# Patient Record
Sex: MALE | Race: WHITE | Employment: FULL TIME | URBAN - METROPOLITAN AREA
[De-identification: names, ages, dates, MRNs, and addresses within clinical notes are randomized per-mention and may not be internally consistent; named-entity substitution may affect disease eponyms.]

---

## 2018-04-09 PROBLEM — I10 ESSENTIAL HYPERTENSION: Status: ACTIVE | Noted: 2018-04-09

## 2019-04-11 PROBLEM — K40.90 RIGHT INGUINAL HERNIA: Status: ACTIVE | Noted: 2019-04-11

## 2019-05-01 RX ORDER — SODIUM CHLORIDE 0.9 % (FLUSH) 0.9 %
5-40 SYRINGE (ML) INJECTION EVERY 8 HOURS
Status: CANCELLED | OUTPATIENT
Start: 2019-05-01

## 2019-05-01 RX ORDER — SODIUM CHLORIDE 0.9 % (FLUSH) 0.9 %
5-40 SYRINGE (ML) INJECTION AS NEEDED
Status: CANCELLED | OUTPATIENT
Start: 2019-05-01

## 2019-05-15 ENCOUNTER — ANESTHESIA (OUTPATIENT)
Dept: SURGERY | Age: 64
End: 2019-05-15
Payer: COMMERCIAL

## 2019-05-15 ENCOUNTER — HOSPITAL ENCOUNTER (OUTPATIENT)
Age: 64
Setting detail: OUTPATIENT SURGERY
Discharge: HOME | End: 2019-05-15
Attending: SURGERY | Admitting: SURGERY
Payer: COMMERCIAL

## 2019-05-15 ENCOUNTER — ANESTHESIA EVENT (OUTPATIENT)
Dept: SURGERY | Age: 64
End: 2019-05-15
Payer: COMMERCIAL

## 2019-05-15 VITALS
TEMPERATURE: 99 F | OXYGEN SATURATION: 92 % | DIASTOLIC BLOOD PRESSURE: 78 MMHG | HEART RATE: 69 BPM | RESPIRATION RATE: 15 BRPM | BODY MASS INDEX: 27.27 KG/M2 | WEIGHT: 194.8 LBS | SYSTOLIC BLOOD PRESSURE: 147 MMHG | HEIGHT: 71 IN

## 2019-05-15 DIAGNOSIS — K40.90 RIGHT INGUINAL HERNIA: Primary | ICD-10-CM

## 2019-05-15 LAB — HGB BLD-MCNC: 14.3 G/DL (ref 13.6–17.2)

## 2019-05-15 PROCEDURE — 74011250636 HC RX REV CODE- 250/636

## 2019-05-15 PROCEDURE — 77030002933 HC SUT MCRYL J&J -A: Performed by: SURGERY

## 2019-05-15 PROCEDURE — 76210000021 HC REC RM PH II 0.5 TO 1 HR: Performed by: SURGERY

## 2019-05-15 PROCEDURE — 77030031139 HC SUT VCRL2 J&J -A: Performed by: SURGERY

## 2019-05-15 PROCEDURE — 74011000250 HC RX REV CODE- 250: Performed by: SURGERY

## 2019-05-15 PROCEDURE — 74011250636 HC RX REV CODE- 250/636: Performed by: SURGERY

## 2019-05-15 PROCEDURE — 77030037088 HC TUBE ENDOTRACH ORAL NSL COVD-A: Performed by: ANESTHESIOLOGY

## 2019-05-15 PROCEDURE — 77030035277 HC OBTRTR BLDELSS DISP INTU -B: Performed by: SURGERY

## 2019-05-15 PROCEDURE — 76010000161 HC OR TIME 1 TO 1.5 HR INTENSV-TIER 1: Performed by: SURGERY

## 2019-05-15 PROCEDURE — 74011000250 HC RX REV CODE- 250

## 2019-05-15 PROCEDURE — 76210000006 HC OR PH I REC 0.5 TO 1 HR: Performed by: SURGERY

## 2019-05-15 PROCEDURE — 85018 HEMOGLOBIN: CPT

## 2019-05-15 PROCEDURE — 74011250636 HC RX REV CODE- 250/636: Performed by: ANESTHESIOLOGY

## 2019-05-15 PROCEDURE — 77030039266 HC ADH SKN EXOFIN S2SG -A: Performed by: SURGERY

## 2019-05-15 PROCEDURE — 77030016151 HC PROTCTR LNS DFOG COVD -B: Performed by: SURGERY

## 2019-05-15 PROCEDURE — 77030035048 HC TRCR ENDOSC OPTCL COVD -B: Performed by: SURGERY

## 2019-05-15 PROCEDURE — 76060000033 HC ANESTHESIA 1 TO 1.5 HR: Performed by: SURGERY

## 2019-05-15 PROCEDURE — 77030032490 HC SLV COMPR SCD KNE COVD -B: Performed by: SURGERY

## 2019-05-15 PROCEDURE — 77030039425 HC BLD LARYNG TRULITE DISP TELE -A: Performed by: ANESTHESIOLOGY

## 2019-05-15 PROCEDURE — 77030022704 HC SUT VLOC COVD -B: Performed by: SURGERY

## 2019-05-15 PROCEDURE — 77030008522 HC TBNG INSUF LAPRO STRY -B: Performed by: SURGERY

## 2019-05-15 PROCEDURE — 77030034849: Performed by: SURGERY

## 2019-05-15 PROCEDURE — 77030019940 HC BLNKT HYPOTHRM STRY -B: Performed by: ANESTHESIOLOGY

## 2019-05-15 PROCEDURE — 77030020703 HC SEAL CANN DISP INTU -B: Performed by: SURGERY

## 2019-05-15 PROCEDURE — 74011250637 HC RX REV CODE- 250/637: Performed by: ANESTHESIOLOGY

## 2019-05-15 PROCEDURE — C1781 MESH (IMPLANTABLE): HCPCS | Performed by: SURGERY

## 2019-05-15 DEVICE — MESH HERN L W4.1XL6.2IN R POLYPR L PORE KNIT LT 3DMAX: Type: IMPLANTABLE DEVICE | Site: INGUINAL | Status: FUNCTIONAL

## 2019-05-15 RX ORDER — ONDANSETRON 2 MG/ML
INJECTION INTRAMUSCULAR; INTRAVENOUS AS NEEDED
Status: DISCONTINUED | OUTPATIENT
Start: 2019-05-15 | End: 2019-05-15 | Stop reason: HOSPADM

## 2019-05-15 RX ORDER — NEOSTIGMINE METHYLSULFATE 1 MG/ML
INJECTION INTRAVENOUS AS NEEDED
Status: DISCONTINUED | OUTPATIENT
Start: 2019-05-15 | End: 2019-05-15 | Stop reason: HOSPADM

## 2019-05-15 RX ORDER — ACETAMINOPHEN 500 MG
1000 TABLET ORAL ONCE
Status: COMPLETED | OUTPATIENT
Start: 2019-05-15 | End: 2019-05-15

## 2019-05-15 RX ORDER — OXYCODONE HYDROCHLORIDE 5 MG/1
5 TABLET ORAL
Status: DISCONTINUED | OUTPATIENT
Start: 2019-05-15 | End: 2019-05-15 | Stop reason: HOSPADM

## 2019-05-15 RX ORDER — FLUMAZENIL 0.1 MG/ML
0.2 INJECTION INTRAVENOUS AS NEEDED
Status: DISCONTINUED | OUTPATIENT
Start: 2019-05-15 | End: 2019-05-15 | Stop reason: HOSPADM

## 2019-05-15 RX ORDER — NALOXONE HYDROCHLORIDE 0.4 MG/ML
0.1 INJECTION, SOLUTION INTRAMUSCULAR; INTRAVENOUS; SUBCUTANEOUS
Status: DISCONTINUED | OUTPATIENT
Start: 2019-05-15 | End: 2019-05-15 | Stop reason: HOSPADM

## 2019-05-15 RX ORDER — MIDAZOLAM HYDROCHLORIDE 1 MG/ML
2 INJECTION, SOLUTION INTRAMUSCULAR; INTRAVENOUS
Status: DISCONTINUED | OUTPATIENT
Start: 2019-05-15 | End: 2019-05-15 | Stop reason: HOSPADM

## 2019-05-15 RX ORDER — HYDROMORPHONE HYDROCHLORIDE 2 MG/ML
0.5 INJECTION, SOLUTION INTRAMUSCULAR; INTRAVENOUS; SUBCUTANEOUS
Status: DISCONTINUED | OUTPATIENT
Start: 2019-05-15 | End: 2019-05-15 | Stop reason: HOSPADM

## 2019-05-15 RX ORDER — SODIUM CHLORIDE 0.9 % (FLUSH) 0.9 %
5-40 SYRINGE (ML) INJECTION AS NEEDED
Status: DISCONTINUED | OUTPATIENT
Start: 2019-05-15 | End: 2019-05-15 | Stop reason: HOSPADM

## 2019-05-15 RX ORDER — OXYCODONE HYDROCHLORIDE 5 MG/1
10 TABLET ORAL
Status: COMPLETED | OUTPATIENT
Start: 2019-05-15 | End: 2019-05-15

## 2019-05-15 RX ORDER — LIDOCAINE HYDROCHLORIDE 20 MG/ML
INJECTION, SOLUTION EPIDURAL; INFILTRATION; INTRACAUDAL; PERINEURAL AS NEEDED
Status: DISCONTINUED | OUTPATIENT
Start: 2019-05-15 | End: 2019-05-15 | Stop reason: HOSPADM

## 2019-05-15 RX ORDER — BUPIVACAINE HYDROCHLORIDE 5 MG/ML
INJECTION, SOLUTION EPIDURAL; INTRACAUDAL AS NEEDED
Status: DISCONTINUED | OUTPATIENT
Start: 2019-05-15 | End: 2019-05-15 | Stop reason: HOSPADM

## 2019-05-15 RX ORDER — SODIUM CHLORIDE, SODIUM LACTATE, POTASSIUM CHLORIDE, CALCIUM CHLORIDE 600; 310; 30; 20 MG/100ML; MG/100ML; MG/100ML; MG/100ML
75 INJECTION, SOLUTION INTRAVENOUS CONTINUOUS
Status: DISCONTINUED | OUTPATIENT
Start: 2019-05-15 | End: 2019-05-15 | Stop reason: HOSPADM

## 2019-05-15 RX ORDER — SODIUM CHLORIDE 0.9 % (FLUSH) 0.9 %
5-40 SYRINGE (ML) INJECTION EVERY 8 HOURS
Status: DISCONTINUED | OUTPATIENT
Start: 2019-05-15 | End: 2019-05-15 | Stop reason: HOSPADM

## 2019-05-15 RX ORDER — FENTANYL CITRATE 50 UG/ML
INJECTION, SOLUTION INTRAMUSCULAR; INTRAVENOUS AS NEEDED
Status: DISCONTINUED | OUTPATIENT
Start: 2019-05-15 | End: 2019-05-15 | Stop reason: HOSPADM

## 2019-05-15 RX ORDER — GLYCOPYRROLATE 0.2 MG/ML
INJECTION INTRAMUSCULAR; INTRAVENOUS AS NEEDED
Status: DISCONTINUED | OUTPATIENT
Start: 2019-05-15 | End: 2019-05-15 | Stop reason: HOSPADM

## 2019-05-15 RX ORDER — HYDROCODONE BITARTRATE AND ACETAMINOPHEN 7.5; 325 MG/1; MG/1
1 TABLET ORAL
Qty: 30 TAB | Refills: 0 | OUTPATIENT
Start: 2019-05-15 | End: 2019-05-22

## 2019-05-15 RX ORDER — KETOROLAC TROMETHAMINE 30 MG/ML
INJECTION, SOLUTION INTRAMUSCULAR; INTRAVENOUS AS NEEDED
Status: DISCONTINUED | OUTPATIENT
Start: 2019-05-15 | End: 2019-05-15 | Stop reason: HOSPADM

## 2019-05-15 RX ORDER — DEXAMETHASONE SODIUM PHOSPHATE 4 MG/ML
INJECTION, SOLUTION INTRA-ARTICULAR; INTRALESIONAL; INTRAMUSCULAR; INTRAVENOUS; SOFT TISSUE AS NEEDED
Status: DISCONTINUED | OUTPATIENT
Start: 2019-05-15 | End: 2019-05-15 | Stop reason: HOSPADM

## 2019-05-15 RX ORDER — ROCURONIUM BROMIDE 10 MG/ML
INJECTION, SOLUTION INTRAVENOUS AS NEEDED
Status: DISCONTINUED | OUTPATIENT
Start: 2019-05-15 | End: 2019-05-15 | Stop reason: HOSPADM

## 2019-05-15 RX ORDER — LIDOCAINE HYDROCHLORIDE 10 MG/ML
0.1 INJECTION INFILTRATION; PERINEURAL AS NEEDED
Status: DISCONTINUED | OUTPATIENT
Start: 2019-05-15 | End: 2019-05-15 | Stop reason: HOSPADM

## 2019-05-15 RX ORDER — PROPOFOL 10 MG/ML
INJECTION, EMULSION INTRAVENOUS AS NEEDED
Status: DISCONTINUED | OUTPATIENT
Start: 2019-05-15 | End: 2019-05-15 | Stop reason: HOSPADM

## 2019-05-15 RX ORDER — CEFAZOLIN SODIUM/WATER 2 G/20 ML
2 SYRINGE (ML) INTRAVENOUS ONCE
Status: COMPLETED | OUTPATIENT
Start: 2019-05-15 | End: 2019-05-15

## 2019-05-15 RX ORDER — DIPHENHYDRAMINE HYDROCHLORIDE 50 MG/ML
12.5 INJECTION, SOLUTION INTRAMUSCULAR; INTRAVENOUS
Status: DISCONTINUED | OUTPATIENT
Start: 2019-05-15 | End: 2019-05-15 | Stop reason: HOSPADM

## 2019-05-15 RX ADMIN — SODIUM CHLORIDE, SODIUM LACTATE, POTASSIUM CHLORIDE, AND CALCIUM CHLORIDE: 600; 310; 30; 20 INJECTION, SOLUTION INTRAVENOUS at 16:19

## 2019-05-15 RX ADMIN — ROCURONIUM BROMIDE 10 MG: 10 INJECTION, SOLUTION INTRAVENOUS at 15:44

## 2019-05-15 RX ADMIN — GLYCOPYRROLATE 0.6 MG: 0.2 INJECTION INTRAMUSCULAR; INTRAVENOUS at 16:07

## 2019-05-15 RX ADMIN — SODIUM CHLORIDE, SODIUM LACTATE, POTASSIUM CHLORIDE, AND CALCIUM CHLORIDE 75 ML/HR: 600; 310; 30; 20 INJECTION, SOLUTION INTRAVENOUS at 13:36

## 2019-05-15 RX ADMIN — OXYCODONE HYDROCHLORIDE 10 MG: 5 TABLET ORAL at 16:37

## 2019-05-15 RX ADMIN — LIDOCAINE HYDROCHLORIDE 80 MG: 20 INJECTION, SOLUTION EPIDURAL; INFILTRATION; INTRACAUDAL; PERINEURAL at 15:07

## 2019-05-15 RX ADMIN — FENTANYL CITRATE 50 MCG: 50 INJECTION, SOLUTION INTRAMUSCULAR; INTRAVENOUS at 15:57

## 2019-05-15 RX ADMIN — ROCURONIUM BROMIDE 40 MG: 10 INJECTION, SOLUTION INTRAVENOUS at 15:07

## 2019-05-15 RX ADMIN — FENTANYL CITRATE 50 MCG: 50 INJECTION, SOLUTION INTRAMUSCULAR; INTRAVENOUS at 15:28

## 2019-05-15 RX ADMIN — PROPOFOL 200 MG: 10 INJECTION, EMULSION INTRAVENOUS at 15:07

## 2019-05-15 RX ADMIN — Medication 2 G: at 15:20

## 2019-05-15 RX ADMIN — ONDANSETRON 4 MG: 2 INJECTION INTRAMUSCULAR; INTRAVENOUS at 16:04

## 2019-05-15 RX ADMIN — ACETAMINOPHEN 1000 MG: 500 TABLET, FILM COATED ORAL at 13:36

## 2019-05-15 RX ADMIN — KETOROLAC TROMETHAMINE 15 MG: 30 INJECTION, SOLUTION INTRAMUSCULAR; INTRAVENOUS at 16:06

## 2019-05-15 RX ADMIN — ROCURONIUM BROMIDE 10 MG: 10 INJECTION, SOLUTION INTRAVENOUS at 15:28

## 2019-05-15 RX ADMIN — NEOSTIGMINE METHYLSULFATE 4 MG: 1 INJECTION INTRAVENOUS at 16:07

## 2019-05-15 RX ADMIN — FENTANYL CITRATE 100 MCG: 50 INJECTION, SOLUTION INTRAMUSCULAR; INTRAVENOUS at 15:07

## 2019-05-15 RX ADMIN — DEXAMETHASONE SODIUM PHOSPHATE 4 MG: 4 INJECTION, SOLUTION INTRA-ARTICULAR; INTRALESIONAL; INTRAMUSCULAR; INTRAVENOUS; SOFT TISSUE at 15:13

## 2019-05-15 NOTE — ANESTHESIA PREPROCEDURE EVALUATION
Relevant Problems No relevant active problems Anesthetic History No history of anesthetic complications Review of Systems / Medical History Patient summary reviewed and pertinent labs reviewed Pulmonary Within defined limits Neuro/Psych Within defined limits Cardiovascular Hypertension: well controlled Exercise tolerance: >4 METS 
  
GI/Hepatic/Renal 
Within defined limits Endo/Other Within defined limits Other Findings Physical Exam 
 
Airway Mallampati: II 
TM Distance: > 6 cm Neck ROM: normal range of motion Mouth opening: Normal 
 
 Cardiovascular Rhythm: regular Rate: normal 
 
 
 
 Dental 
No notable dental hx Pulmonary Breath sounds clear to auscultation Abdominal 
 
 
 
 Other Findings Anesthetic Plan ASA: 2 Anesthesia type: general 
 
 
 
 
 
Anesthetic plan and risks discussed with: Patient

## 2019-05-15 NOTE — ANESTHESIA POSTPROCEDURE EVALUATION
Procedure(s): RIGHT HERNIA INGUINAL REPAIR ROBOTIC ASSISTED. general 
 
Anesthesia Post Evaluation Multimodal analgesia: multimodal analgesia used between 6 hours prior to anesthesia start to PACU discharge Patient location during evaluation: PACU Patient participation: complete - patient participated Level of consciousness: awake Pain management: adequate Airway patency: patent Anesthetic complications: no 
Cardiovascular status: acceptable and hemodynamically stable Respiratory status: acceptable Hydration status: acceptable Comments: Acceptable for discharge from PACU. Post anesthesia nausea and vomiting:  none Vitals Value Taken Time /70 5/15/2019  4:42 PM  
Temp 36.9 °C (98.4 °F) 5/15/2019  4:23 PM  
Pulse 66 5/15/2019  4:53 PM  
Resp 13 5/15/2019  4:53 PM  
SpO2 91 % 5/15/2019  4:53 PM  
Vitals shown include unvalidated device data.

## 2019-05-15 NOTE — DISCHARGE INSTRUCTIONS
Discharge Instructions:    1. May shower. No tub baths. 2. Diet: as tolerated. 3. No driving while taking pain medication. 4. No lifting over 10 pounds. Patient Education        Hernia Repair: What to Expect at [address] are likely to have pain for the next few days. You may also feel like you have the flu, and you may have a low fever and feel tired and nauseated. This is common. You should feel better after a few days and will probably feel much better in 7 days. For several weeks you may feel twinges or pulling in the hernia repair when you move. You may have some bruising on the scrotum and along the penis. This is normal. Men will need to wear a jockstrap or briefs, not boxers, for scrotal support for several days after a groin (inguinal) hernia repair. Eat Your [employer] bicycle shorts may provide good support. This care sheet gives you a general idea about how long it will take for you to recover. But each person recovers at a different pace. Follow the steps below to get better as quickly as possible. How can you care for yourself at home? Activity    · Rest when you feel tired. Getting enough sleep will help you recover.     · Try to walk each day. Start by walking a little more than you did the day before. Bit by bit, increase the amount you walk. Walking boosts blood flow and helps prevent pneumonia and constipation.     · Avoid strenuous activities, such as biking, jogging, weight lifting, or aerobic exercise, until your doctor says it is okay.     · Avoid lifting anything that would make you strain. This may include heavy grocery bags and milk containers, a heavy briefcase or backpack, cat litter or dog food bags, a vacuum , or a child.     · You may drive when you are no longer taking pain medicine and can quickly move your foot from the gas pedal to the brake. You must also be able to sit comfortably for a long period of time, even if you do not plan to go far.  You might get caught in traffic.     · Most people are able to return to work within 1 to 2 weeks after surgery.     · You may shower 24 to 48 hours after surgery, if your doctor okays it. Pat the cut (incision) dry. Do not take a bath for the first 2 weeks, or until your doctor tells you it is okay.     · Your doctor will tell you when you can have sex again. Diet    · You can eat your normal diet. If your stomach is upset, try bland, low-fat foods like plain rice, broiled chicken, toast, and yogurt.     · Drink plenty of fluids (unless your doctor tells you not to).     · You may notice that your bowel movements are not regular right after your surgery. This is common. Avoid constipation and straining with bowel movements. You may want to take a fiber supplement every day. If you have not had a bowel movement after a couple of days, ask your doctor about taking a mild laxative. Medicines    · Your doctor will tell you if and when you can restart your medicines. He or she will also give you instructions about taking any new medicines.     · If you take blood thinners, such as warfarin (Coumadin), clopidogrel (Plavix), or aspirin, be sure to talk to your doctor. He or she will tell you if and when to start taking those medicines again. Make sure that you understand exactly what your doctor wants you to do.     · Be safe with medicines. Take pain medicines exactly as directed. ? If the doctor gave you a prescription medicine for pain, take it as prescribed. ? If you are not taking a prescription pain medicine, take an over-the-counter medicine such as acetaminophen (Tylenol), ibuprofen (Advil, Motrin), or naproxen (Aleve). Read and follow all instructions on the label. ? Do not take two or more pain medicines at the same time unless the doctor told you to. Many pain medicines have acetaminophen, which is Tylenol.  Too much acetaminophen (Tylenol) can be harmful.     · If your doctor prescribed antibiotics, take them as directed. Do not stop taking them just because you feel better. You need to take the full course of antibiotics.     · If you think your pain medicine is making you sick to your stomach:  ? Take your medicine after meals (unless your doctor has told you not to). ? Ask your doctor for a different pain medicine. Incision care    · If you have strips of tape on the cut (incision) the doctor made, leave the tape on for a week or until it falls off.     · If you have staples closing the cut, you will need to visit your doctor in 1 to 2 weeks to have them removed.     · Wash the area daily with warm, soapy water and pat it dry. Follow-up care is a key part of your treatment and safety. Be sure to make and go to all appointments, and call your doctor if you are having problems. It's also a good idea to know your test results and keep a list of the medicines you take. When should you call for help? Call 911 anytime you think you may need emergency care. For example, call if:    · You passed out (lost consciousness).     · You are short of breath.    Call your doctor now or seek immediate medical care if:    · You have pain that does not get better after you take pain medicine.     · You are sick to your stomach and cannot keep fluids down.     · You have signs of a blood clot in your leg (called a deep vein thrombosis), such as:  ? Pain in your calf, back of the knee, thigh, or groin. ? Redness and swelling in your leg or groin.     · You cannot pass stools or gas.     · Bright red blood has soaked through the bandage over your incision.     · You have loose stitches, or your incision comes open.     · You have signs of infection, such as:  ? Increased pain, swelling, warmth, or redness. ? Red streaks leading from the incision. ? Pus draining from the incision. ? A fever.    Watch closely for any changes in your health, and be sure to contact your doctor if you have any problems. Where can you learn more?   Go to http://carlos alberto-yudith.info/. Enter L693 in the search box to learn more about \"Hernia Repair: What to Expect at Home. \"  Current as of: March 27, 2018  Content Version: 11.9  © 9995-8886 [employer]. Care instructions adapted under license by [employer] (which disclaims liability or warranty for this information). If you have questions about a medical condition or this instruction, always ask your healthcare professional. [address] any warranty or liability for your use of this information.

## 2019-05-15 NOTE — H&P
[facility] 
  
Surgery H&P 
 
  
Subjective:  
  
Patient is a 61 y.o. male who was referred for evaluation of  right inguinal hernia. Symptoms were first noted 3 weeks ago. Symptoms did not start at work. He noticed the hernia after severe coughing Pain is dull and aching. Lump present when standing Patient has no symptoms of chronic constipation, chronic cough, difficulty urinating. Patient does not have a history of previous hernia surgery. Patient Active Problem List  
  Diagnosis Date Noted  Right inguinal hernia 2019  Essential hypertension 2018  Routine health maintenance 2016  Varicose veins of both lower extremities 2016  Family history of malignant neoplasm of prostate 2015  Kyphosis 2015 Past Medical History:  
Diagnosis Date  Abnormal pulmonary function test 2015  
  2/18/15 Moderate restriction noted on Spirometry Smoked 1 ppd x 10 years and quit in .  Dyslipidemia    
 Elevated serum GGT level    
  13    Essential hypertension, benign 2015  Family history of prostate cancer    
  His father had it in his mid-67s. Ulysses's last PSA in  was 2.2.  Kyphosis 2015  Microscopic hematuria 2015  Prediabetes 2016  Varicose veins of both legs with pain    
  
     
Past Surgical History:  
Procedure Laterality Date  HX COLONOSCOPY   2011  
  
     
Family History Problem Relation Age of Onset  Hypertension Brother    
 Other Mother    
      Back Problems  Hypertension Father    
 Cancer Father 68  
      Prostate Cancer  Hypertension Brother    
  
Social History  
  
     
Tobacco Use  Smoking status: Former Smoker  
    Packs/day: 1.00  
    Years: 10.00  
    Pack years: 10.00  
    Start date: 1973  
    Last attempt to quit: 1983  
    Years since quittin.2  Smokeless tobacco: Never Used Substance Use Topics  Alcohol use: Yes  
    Alcohol/week: 7.0 oz  
    Types: 14 Cans of beer per week Current Outpatient Medications Medication Sig  
 amLODIPine (NORVASC) 10 mg tablet Take 1 Tab by mouth daily.  enalapril (VASOTEC) 20 mg tablet Take 1 Tab by mouth two (2) times a day. Indications: hypertension  
  
No current facility-administered medications for this visit. Allergies Allergen Reactions  Sulfa (Sulfonamide Antibiotics) Anaphylaxis  
  
  
  
Review of Systems: A comprehensive review of systems was negative except for that written in the History of Present Illness. 
  
Objective:  
  
Visit Vitals /80 Ht 5' 11\" (1.803 m) Wt 197 lb (89.4 kg) BMI 27.48 kg/m²  
  
  
  
  
Physical Exam: 
Gen: WD/WN, appears stated age, no distress HEENT: WNL, no jaundice CV: RRR no murmur Lungs: CTA bilaterally Abdomen: soft, NT/ND. No masses Hernia exam: right inguinal hernia. is reducible. Pulses: 2+ Extremities: no deformity and full range of motion  
  
Data Review: CBC:  
     
Lab Results Component Value Date/Time  
  WBC 4.8 10/09/2017 11:01 AM  
  RBC 4.25 10/09/2017 11:01 AM  
  HGB 13.7 10/09/2017 11:01 AM  
  HCT 40.7 10/09/2017 11:01 AM  
  PLATELET 511 60/85/3759 11:01 AM  
  
BMP:  
     
Lab Results Component Value Date/Time  
  Glucose 111 (H) 10/01/2018 08:35 AM  
  Sodium 142 10/01/2018 08:35 AM  
  Potassium 5.1 10/01/2018 08:35 AM  
  Chloride 103 10/01/2018 08:35 AM  
  CO2 24 10/01/2018 08:35 AM  
  BUN 16 10/01/2018 08:35 AM  
  Creatinine 0.89 10/01/2018 08:35 AM  
  Calcium 9.6 10/01/2018 08:35 AM  
  
  
Assessment:  
  
Right inguinal hernia, easily reducible. 
  
Plan:  
  
Recommend robotic right inguinal hernia repair with mesh.   
  
Discussed possibility of incarceration, strangulation, enlargement in size over time, and the risk of emergency surgery in the face of strangulation. Also discussed the risk of surgery including recurrence which can be up to 50% in the case of incisional or complex hernias, use of prosthetic materials (mesh) and the increased risk of infection, postoperative infection and the possible need for reoperation and removal of mesh if used, possibility of postoperative small bowel obstruction or ileus, and the risks of general anesthetic. The patient understands the risks; any and all questions were answered to the patient's satisfaction. 
  
2. Patient has elected to proceed with surgical treatment. Procedure will be scheduled.   Written consent was obtained. 
  
Signed By: Shad Lane MD

## 2019-05-15 NOTE — OP NOTES
PREPROCEDURE DIAGNOSIS: right inguinal hernia    POSTPROCEDURE DIAGNOSIS: right inguinal hernia    NAME OF PROCEDURE: Robotic assisted  right inguinal hernia repair with mesh,  ARIN       SURGEON: Radha Keenan MD    ASSISTANT: None. ANESTHESIA: General.    BLOOD LOSS: 5 mL    BLOOD REPLACED: None. CONDITION AT COMPLETION: Stable. INDICATIONS: this is a 59-year-old male who presented with a right inguinal hernia. The risks benefits and alternatives to open and minimally invasive repair were all discussed clearly with the patient  Prior to going to the operating room. The patient understood the risks and gave consent for the above procedure. PROCEDURE:  The patient was taken to the operating room where he underwent general anesthetic with no difficulties. The abdomen was prepped and draped in routine sterile fashion. Timeout was performed identifying the patient procedure and laterality of the procedure. IV antibiotics were given at time of induction of anesthetic. A small periumbilical incision was made with a #15 blade and dissection through the subcutaneous tissues to the midline   fascia was performed in a blunt fashion. The fascia was opened in the midline using an Hair technique. He is on trocar was placed into the peritoneal cavity and the abdomen insufflated. The camera was introduced and the abdomen inspected. The right inguinal hernia defect was clearly identified. No other gross pathology was noted within the abdomen. 2 robotic working ports were placed one on the left right at the umbilical level under direct vision. Graspers and electrocautery scissors were inserted under direct vision. The peritoneum was scored at the arcuate line on the right side from midline laterally. The peritoneum was then taken down distally to the inguinal region using gentle electrocautery and sharp scissor dissection.   Blunt graspers were then used to reduce the hernia sac from the inguinal canal and separate the sac from the cord structures which were clearly identified and preserved. The pubic tubercle and Shar's ligament were visualized and palpated. A large lightweight Bard 3-D max mesh was then placed through the trocar along with  A 2-0 Vicryl suture and a absorbable V lock suture. The Vicryl was used to secure the mesh to the pubic tubercle and Shar's ligament and anterior medial abdominal wall. The peritoneum was thenClosed using a running 3 lock suture. The instruments were removed and the ports taken out under direct vision. Trocar was removed and the fascia was closed with figure-of-eight 0 vicryl suture. Skin incisions were then all closed with subcuticular 4-0 monocryl. dermabond dressing was applied. The patient was awakened, extubated, and taken to recovery in good condition. Sheehan catheter was removed at completion of the procedure.

## 2020-10-19 ENCOUNTER — HOSPITAL ENCOUNTER (OUTPATIENT)
Dept: MRI IMAGING | Age: 65
Discharge: HOME | End: 2020-10-19
Attending: UROLOGY
Payer: MEDICARE

## 2020-10-19 DIAGNOSIS — R97.20 ELEVATED PSA: ICD-10-CM

## 2020-10-19 PROCEDURE — 72197 MRI PELVIS W/O & W/DYE: CPT

## 2020-10-19 PROCEDURE — 74011250636 HC RX REV CODE- 250/636: Performed by: UROLOGY

## 2020-10-19 PROCEDURE — 74011000258 HC RX REV CODE- 258: Performed by: UROLOGY

## 2020-10-19 PROCEDURE — A9575 INJ GADOTERATE MEGLUMI 0.1ML: HCPCS | Performed by: UROLOGY

## 2020-10-19 RX ORDER — GADOTERATE MEGLUMINE 376.9 MG/ML
18 INJECTION INTRAVENOUS
Status: COMPLETED | OUTPATIENT
Start: 2020-10-19 | End: 2020-10-19

## 2020-10-19 RX ORDER — SODIUM CHLORIDE 0.9 % (FLUSH) 0.9 %
10 SYRINGE (ML) INJECTION
Status: COMPLETED | OUTPATIENT
Start: 2020-10-19 | End: 2020-10-19

## 2020-10-19 RX ADMIN — Medication 10 ML: at 08:16

## 2020-10-19 RX ADMIN — SODIUM CHLORIDE 100 ML: 900 INJECTION, SOLUTION INTRAVENOUS at 08:16

## 2020-10-19 RX ADMIN — GADOTERATE MEGLUMINE 18 ML: 376.9 INJECTION INTRAVENOUS at 08:16

## 2020-12-14 ENCOUNTER — ANESTHESIA EVENT (OUTPATIENT)
Dept: SURGERY | Age: 65
End: 2020-12-14
Payer: MEDICARE

## 2020-12-15 ENCOUNTER — ANESTHESIA (OUTPATIENT)
Dept: SURGERY | Age: 65
End: 2020-12-15
Payer: MEDICARE

## 2020-12-15 ENCOUNTER — HOSPITAL ENCOUNTER (OUTPATIENT)
Age: 65
Setting detail: OUTPATIENT SURGERY
Discharge: HOME | End: 2020-12-15
Attending: UROLOGY | Admitting: UROLOGY
Payer: MEDICARE

## 2020-12-15 VITALS
HEART RATE: 50 BPM | OXYGEN SATURATION: 98 % | WEIGHT: 200 LBS | BODY MASS INDEX: 27.89 KG/M2 | SYSTOLIC BLOOD PRESSURE: 132 MMHG | RESPIRATION RATE: 16 BRPM | DIASTOLIC BLOOD PRESSURE: 84 MMHG | TEMPERATURE: 97.6 F

## 2020-12-15 LAB — GLUCOSE BLD STRIP.AUTO-MCNC: 106 MG/DL (ref 65–100)

## 2020-12-15 PROCEDURE — 2709999900 HC NON-CHARGEABLE SUPPLY: Performed by: UROLOGY

## 2020-12-15 PROCEDURE — 74011250636 HC RX REV CODE- 250/636: Performed by: UROLOGY

## 2020-12-15 PROCEDURE — 74011250636 HC RX REV CODE- 250/636: Performed by: ANESTHESIOLOGY

## 2020-12-15 PROCEDURE — 76210000020 HC REC RM PH II FIRST 0.5 HR: Performed by: UROLOGY

## 2020-12-15 PROCEDURE — 88305 TISSUE EXAM BY PATHOLOGIST: CPT

## 2020-12-15 PROCEDURE — 74011250636 HC RX REV CODE- 250/636: Performed by: NURSE ANESTHETIST, CERTIFIED REGISTERED

## 2020-12-15 PROCEDURE — 74011000258 HC RX REV CODE- 258: Performed by: UROLOGY

## 2020-12-15 PROCEDURE — 74011000250 HC RX REV CODE- 250: Performed by: NURSE ANESTHETIST, CERTIFIED REGISTERED

## 2020-12-15 PROCEDURE — 77030003481 HC NDL BIOP GUN BARD -B: Performed by: UROLOGY

## 2020-12-15 PROCEDURE — 76210000063 HC OR PH I REC FIRST 0.5 HR: Performed by: UROLOGY

## 2020-12-15 PROCEDURE — 82962 GLUCOSE BLOOD TEST: CPT

## 2020-12-15 PROCEDURE — 76060000032 HC ANESTHESIA 0.5 TO 1 HR: Performed by: UROLOGY

## 2020-12-15 PROCEDURE — 74011250637 HC RX REV CODE- 250/637: Performed by: ANESTHESIOLOGY

## 2020-12-15 PROCEDURE — 76010000154 HC OR TIME FIRST 0.5 HR: Performed by: UROLOGY

## 2020-12-15 RX ORDER — SODIUM CHLORIDE 0.9 % (FLUSH) 0.9 %
5-40 SYRINGE (ML) INJECTION AS NEEDED
Status: DISCONTINUED | OUTPATIENT
Start: 2020-12-15 | End: 2020-12-15 | Stop reason: HOSPADM

## 2020-12-15 RX ORDER — CIPROFLOXACIN 500 MG/1
500 TABLET ORAL 2 TIMES DAILY
Qty: 6 TAB | Refills: 0 | Status: SHIPPED | OUTPATIENT
Start: 2020-12-15 | End: 2021-01-04 | Stop reason: ALTCHOICE

## 2020-12-15 RX ORDER — SODIUM CHLORIDE, SODIUM LACTATE, POTASSIUM CHLORIDE, CALCIUM CHLORIDE 600; 310; 30; 20 MG/100ML; MG/100ML; MG/100ML; MG/100ML
100 INJECTION, SOLUTION INTRAVENOUS CONTINUOUS
Status: DISCONTINUED | OUTPATIENT
Start: 2020-12-15 | End: 2020-12-15 | Stop reason: HOSPADM

## 2020-12-15 RX ORDER — SODIUM CHLORIDE 0.9 % (FLUSH) 0.9 %
5-40 SYRINGE (ML) INJECTION EVERY 8 HOURS
Status: DISCONTINUED | OUTPATIENT
Start: 2020-12-15 | End: 2020-12-15 | Stop reason: HOSPADM

## 2020-12-15 RX ORDER — OXYCODONE AND ACETAMINOPHEN 5; 325 MG/1; MG/1
1 TABLET ORAL AS NEEDED
Status: DISCONTINUED | OUTPATIENT
Start: 2020-12-15 | End: 2020-12-15 | Stop reason: HOSPADM

## 2020-12-15 RX ORDER — SODIUM CHLORIDE 9 MG/ML
50 INJECTION, SOLUTION INTRAVENOUS CONTINUOUS
Status: DISCONTINUED | OUTPATIENT
Start: 2020-12-15 | End: 2020-12-15 | Stop reason: HOSPADM

## 2020-12-15 RX ORDER — DIPHENHYDRAMINE HYDROCHLORIDE 50 MG/ML
12.5 INJECTION, SOLUTION INTRAMUSCULAR; INTRAVENOUS
Status: DISCONTINUED | OUTPATIENT
Start: 2020-12-15 | End: 2020-12-15 | Stop reason: HOSPADM

## 2020-12-15 RX ORDER — MIDAZOLAM HYDROCHLORIDE 1 MG/ML
2 INJECTION, SOLUTION INTRAMUSCULAR; INTRAVENOUS ONCE
Status: COMPLETED | OUTPATIENT
Start: 2020-12-15 | End: 2020-12-15

## 2020-12-15 RX ORDER — PROPOFOL 10 MG/ML
INJECTION, EMULSION INTRAVENOUS AS NEEDED
Status: DISCONTINUED | OUTPATIENT
Start: 2020-12-15 | End: 2020-12-15 | Stop reason: HOSPADM

## 2020-12-15 RX ORDER — MIDAZOLAM HYDROCHLORIDE 1 MG/ML
2 INJECTION, SOLUTION INTRAMUSCULAR; INTRAVENOUS
Status: DISCONTINUED | OUTPATIENT
Start: 2020-12-15 | End: 2020-12-15 | Stop reason: HOSPADM

## 2020-12-15 RX ORDER — PROPOFOL 10 MG/ML
INJECTION, EMULSION INTRAVENOUS
Status: DISCONTINUED | OUTPATIENT
Start: 2020-12-15 | End: 2020-12-15 | Stop reason: HOSPADM

## 2020-12-15 RX ORDER — FAMOTIDINE 20 MG/1
20 TABLET, FILM COATED ORAL ONCE
Status: COMPLETED | OUTPATIENT
Start: 2020-12-15 | End: 2020-12-15

## 2020-12-15 RX ORDER — LIDOCAINE HYDROCHLORIDE 10 MG/ML
0.1 INJECTION INFILTRATION; PERINEURAL AS NEEDED
Status: DISCONTINUED | OUTPATIENT
Start: 2020-12-15 | End: 2020-12-15 | Stop reason: HOSPADM

## 2020-12-15 RX ORDER — FENTANYL CITRATE 50 UG/ML
25 INJECTION, SOLUTION INTRAMUSCULAR; INTRAVENOUS ONCE
Status: DISCONTINUED | OUTPATIENT
Start: 2020-12-15 | End: 2020-12-15 | Stop reason: HOSPADM

## 2020-12-15 RX ORDER — SODIUM CHLORIDE, SODIUM LACTATE, POTASSIUM CHLORIDE, CALCIUM CHLORIDE 600; 310; 30; 20 MG/100ML; MG/100ML; MG/100ML; MG/100ML
75 INJECTION, SOLUTION INTRAVENOUS CONTINUOUS
Status: DISCONTINUED | OUTPATIENT
Start: 2020-12-15 | End: 2020-12-15 | Stop reason: HOSPADM

## 2020-12-15 RX ORDER — ONDANSETRON 2 MG/ML
INJECTION INTRAMUSCULAR; INTRAVENOUS AS NEEDED
Status: DISCONTINUED | OUTPATIENT
Start: 2020-12-15 | End: 2020-12-15 | Stop reason: HOSPADM

## 2020-12-15 RX ORDER — LIDOCAINE HYDROCHLORIDE 20 MG/ML
INJECTION, SOLUTION EPIDURAL; INFILTRATION; INTRACAUDAL; PERINEURAL AS NEEDED
Status: DISCONTINUED | OUTPATIENT
Start: 2020-12-15 | End: 2020-12-15 | Stop reason: HOSPADM

## 2020-12-15 RX ORDER — OXYCODONE HYDROCHLORIDE 5 MG/1
5 TABLET ORAL
Status: DISCONTINUED | OUTPATIENT
Start: 2020-12-15 | End: 2020-12-15 | Stop reason: HOSPADM

## 2020-12-15 RX ORDER — HYDROMORPHONE HYDROCHLORIDE 2 MG/ML
0.5 INJECTION, SOLUTION INTRAMUSCULAR; INTRAVENOUS; SUBCUTANEOUS
Status: DISCONTINUED | OUTPATIENT
Start: 2020-12-15 | End: 2020-12-15 | Stop reason: HOSPADM

## 2020-12-15 RX ADMIN — ONDANSETRON 4 MG: 2 INJECTION INTRAMUSCULAR; INTRAVENOUS at 09:15

## 2020-12-15 RX ADMIN — SODIUM CHLORIDE, SODIUM LACTATE, POTASSIUM CHLORIDE, AND CALCIUM CHLORIDE 1000 ML: 600; 310; 30; 20 INJECTION, SOLUTION INTRAVENOUS at 07:45

## 2020-12-15 RX ADMIN — MIDAZOLAM 2 MG: 1 INJECTION INTRAMUSCULAR; INTRAVENOUS at 08:16

## 2020-12-15 RX ADMIN — FAMOTIDINE 20 MG: 20 TABLET, FILM COATED ORAL at 08:14

## 2020-12-15 RX ADMIN — PROPOFOL 80 MG: 10 INJECTION, EMULSION INTRAVENOUS at 09:11

## 2020-12-15 RX ADMIN — CEFTRIAXONE 1 G: 1 INJECTION, POWDER, FOR SOLUTION INTRAMUSCULAR; INTRAVENOUS at 09:00

## 2020-12-15 RX ADMIN — LIDOCAINE HYDROCHLORIDE 80 MG: 20 INJECTION, SOLUTION EPIDURAL; INFILTRATION; INTRACAUDAL; PERINEURAL at 09:11

## 2020-12-15 RX ADMIN — PROPOFOL 140 MCG/KG/MIN: 10 INJECTION, EMULSION INTRAVENOUS at 09:11

## 2020-12-15 RX ADMIN — SODIUM CHLORIDE, SODIUM LACTATE, POTASSIUM CHLORIDE, AND CALCIUM CHLORIDE: 600; 310; 30; 20 INJECTION, SOLUTION INTRAVENOUS at 08:59

## 2020-12-15 NOTE — ANESTHESIA POSTPROCEDURE EVALUATION
Procedure(s):  MRI FUSION GUIDED PROSTATE BIOPSY. total IV anesthesia    Anesthesia Post Evaluation      Multimodal analgesia: multimodal analgesia used between 6 hours prior to anesthesia start to PACU discharge  Patient location during evaluation: bedside  Patient participation: complete - patient participated  Level of consciousness: awake  Pain management: adequate  Airway patency: patent  Anesthetic complications: no  Cardiovascular status: acceptable and stable  Respiratory status: acceptable and room air  Hydration status: acceptable  Post anesthesia nausea and vomiting:  none      INITIAL Post-op Vital signs:   Vitals Value Taken Time   /70 12/15/2020  9:41 AM   Temp 36.4 °C (97.6 °F) 12/15/2020  9:30 AM   Pulse 53 12/15/2020  9:42 AM   Resp 13 12/15/2020  9:42 AM   SpO2 98 % 12/15/2020  9:42 AM   Vitals shown include unvalidated device data.

## 2020-12-15 NOTE — OP NOTES
Operative Note                Patient: Christiana Calderon [id number]    Date of Surgery: 12/15/20    Preoperative Diagnosis: Elevated psa and prostate lesion(s) on MRI imaging    Postoperative Diagnosis:  same    Surgeon(s) and Role:     * Garner Sever, MD - Primary     Anesthesia:  IV sedation     Procedure: Procedure(s):  [facility] MRI fused TRUS prostate biopsy     RISKS DISCUSSION:     Discussed the risk of surgery including infection, hematoma, bleeding, and the risks of general anesthetic. The patient understands the risks, any and all questions were answered to the patient's satisfaction and they freely signed the consent for operation. [facility] MRI FUSED TRANSRECTAL ULTRASOUND GUIDED BIOPSY OF THE PROSTATE    All risks, benefits and alternatives were again reviewed and he is willing to proceed at this time. The patient was placed in the left lateral decubitus position. I then inserted the transrectal ultrasound probe into the rectum. The prostate was visualized. The prostate appeared homogenous in appearance. Ultrasonographic sweep of the prostate was performed to obtain images to link to MRI via [facility]RONAV. There were 1 regions of interest based upon MRI imaging. 4 biopsies of regions of interest were then obtained using the ultrasound images for guidance that had been linked to the previous MRI using [facility]. I then performed 12 needle core biopsies using a standard sextant biopsy format with traditional ultrasound images for guidance. The ultrasound probe was removed. The patient tolerated the procedure well.       PROSTATE VOLUME:  29 gm PSA 5.2 PSAD 0.18    Estimated Blood Loss:  minimal    Specimens: prostate biopsies             Drains: none                 Implants: * No implants in log *           Signed By: Garner Sever, MD

## 2020-12-15 NOTE — H&P
[place]   : 1955       Chief Complaint   Patient presents with    New Patient    Elevated PSA   HPI   Paz Reyes III is a 72 y.o. male Last seen here in  for elevated pSA. Was applying for a job at [employer] and was found to have microscopic hematuria. Saw Dr. Min Later in 2-15, UA showed Tr heme, 0-3 rbc's. Has family history of prostate cancer in his father. PSA is 2.7 in 3-15. Was followed in [place] for elevated PSA. PSA went from 1.6 to 2.6 in one year. Referred back by Alayna Flores for evaluation and treatment of elevated PSA., in , total PSA 5.2 with 13.8% free PSA. PSA was 5.1 in . PSA was 2.4 , 3.3 in , 3.4 in 2018, 5.0 in  (per pt). He denies any dysuria or hematuria. Voids with good stream.        Past Medical History:   Diagnosis Date    Abnormal pulmonary function test 2015    2/18/15 Moderate restriction noted on Spirometry Smoked 1 ppd x 10 years and quit in .  Dyslipidemia     Elevated PSA     Elevated serum GGT level     13     Essential hypertension, benign 2015    Family history of prostate cancer     His father had it in his mid-67s. Ulysses's last PSA in  was 2.2.  Kyphosis 2015    Microscopic hematuria 2015    Prediabetes 2016    Right inguinal hernia     Varicose veins of both legs with pain            Past Surgical History:   Procedure Laterality Date    HX COLONOSCOPY  2011    HX HERNIA REPAIR Right 05/15/2019            Current Outpatient Medications   Medication Sig Dispense Refill    amLODIPine (NORVASC) 10 mg tablet Take 1 Tab by mouth daily. (Patient taking differently: Take 10 mg by mouth nightly.) 90 Tab 1    enalapril (VASOTEC) 20 mg tablet Take 1 Tab by mouth two (2) times a day. Indications: hypertension (Patient taking differently: Take 20 mg by mouth nightly.  Indications: high blood pressure) 180 Tab 1          Allergies   Allergen Reactions    Sulfa (Sulfonamide Antibiotics) Anaphylaxis     Social History           Socioeconomic History    Marital status:      Spouse name: Gregorio Printdev    Number of children: 2    Years of education: Not on file    Highest education level: Not on file   Occupational History    Occupation: Associate   Social Needs    Financial resource strain: Not on file    Food insecurity     Worry: Not on file     Inability: Not on file   [employer] needs     Medical: Not on file     Non-medical: Not on file   Tobacco Use    Smoking status: Former Smoker     Packs/day: 1.00     Years: 10.00     Pack years: 10.00     Start date: 1973     Last attempt to quit: 1983     Years since quittin.7    Smokeless tobacco: Never Used   Substance and Sexual Activity    Alcohol use: Yes     Alcohol/week: 11.7 standard drinks     Types: 14 Cans of beer per week    Drug use: No    Sexual activity: Yes     Partners: Female   Lifestyle    Physical activity     Days per week: Not on file     Minutes per session: Not on file    Stress: Not on file   Relationships    Social connections     Talks on phone: Not on file     Gets together: Not on file     Attends Mosque service: Not on file     Active member of club or organization: Not on file     Attends meetings of clubs or organizations: Not on file     Relationship status: Not on file    Intimate partner violence     Fear of current or ex partner: Not on file     Emotionally abused: Not on file     Physically abused: Not on file     Forced sexual activity: Not on file   Other Topics Concern    Not on file   Social History Narrative    2/18/15 , works FT as an Associate at [employer], recently moved her from [place], [place] where he [address] for years. Father of Rosemary Duncan (92) and Mormon Felts (3/16/84).             Family History   Problem Relation Age of Onset    Hypertension Brother     Other Mother     Back Problems    Hypertension Father     Cancer Father 68    Prostate Cancer    Hypertension Brother    Review of Systems   Constitutional: Negative for fever and headaches. ENT: Negative for high frequency hearing loss. Respiratory: Negative for shortness of breath. Cardiovascular: Positive for hypertension and varicose veins. Negative for chest pain. GI: Negative for abdominal pain. Genitourinary: Positive for nocturia. Negative for urinary burning and hematuria. Musculoskeletal: Positive for back pain. Neurological: Negative for numbness. Psychological: Negative for depression. Endocrine: Negative for fatigue. Hem/Lymphatic: Positive for easy bruising. Visit Vitals   BP (!) 151/79   Pulse 86   Temp 97.2 °F (36.2 °C) (Temporal)   Wt 200 lb (90.7 kg)   BMI 27.89 kg/m²   Physical Exam   General   Mental Status - Patient is alert and oriented X3. General Appearance - Not in acute distress. Build & Nutrition - Well nourished and Well developed. Gait - Normal.   Eye   Pupil - Bilateral - Normal, Equal and Round. Chest and Lung Exam   Auscultation:   Lung Sounds: - Normal, clear to auscultation. Cardiovascular   Cardiovascular examination reveals - normal heart sounds, regular rate and rhythm with no murmurs. Abdomen   Palpation/Percussion: Palpation and Percussion of the abdomen reveal - No Rebound tenderness, No Rigidity (guarding), No hepatosplenomegaly and No Palpable abdominal masses. Auscultation: Auscultation of the abdomen reveals - Bowel sounds normal.   Male Genitourinary   Evaluation of genitourinary system reveals - scrotum non-tender, no masses, normal testes, no palpable masses, normal epididymides, urethral meatus normal, no discharge, normal penis and normal seminal vesicles.    Rectal   Anorectal Exam: Prostate - not enlarged, no nodules   Peripheral Vascular   Lower Extremity: Inspection - Bilateral - Inspection Normal.   Neurologic   Neurologic evaluation reveals - upper and lower extremity deep tendon reflexes intact bilaterally . Cranial Nerves: - Cranial nerves are grossly intact. Neuropsychiatric   The patient's mood and affect are described as - normal.   Musculoskeletal   Global Assessment   Examination of related systems reveals - no generalized swelling or edema of extremities. Lymphatic   General Lymphatics   Description - No Generalized lymphadenopathy. Tenderness - Non Tender. Urinalysis   UA - Dipstick          Results for orders placed or performed in visit on 10/05/20   AMB POC URINALYSIS DIP STICK AUTO W/ MICRO (PGU) Status: None   Result Value Ref Range Status    Glucose (UA POC) Negative Negative mg/dL Final    Bilirubin (UA POC) Small Negative Final    Ketones (UA POC) 15  Negative Final    Specific gravity (UA POC) 1.025 1.001 - 1.035 Final    Blood (UA POC) Trace Negative Final    pH (UA POC) 5.5 4.6 - 8.0 Final    Protein (UA POC) 30  Negative Final    Urobilinogen (POC) 0.2 mg/dL  Final    Nitrites (UA POC) Negative Negative Final    Leukocyte esterase (UA POC) Negative Negative Final   UA - Micro   WBC - 0   RBC - 0   Bacteria - 0   Epith - 0   Physical Exam   Assessment and Plan     ICD-10-CM ICD-9-CM    1. Elevated PSA  R97.20 790.93 AMB POC URINALYSIS DIP STICK AUTO W/ MICRO (PGU)      MRI PELV W WO CONT           Orders Placed This Encounter    MRI PELV W WO CONT     Standing Status:  Future     Standing Expiration Date:  11/5/2021     Order Specific Question:  STAT Creatinine as indicated     Answer: Yes    AMB POC URINALYSIS DIP STICK AUTO W/ MICRO (PGU)   Discussed prostate biopsy, with or without MRI. Will proceed with MRI. May need MRI fusion biopsy. MRI 10-19-20:    FINDINGS:  -PROSTATE SIZE: 4.3 x 3.2 x 3.8 cm  -CENTRAL GLAND: Normal.  -PERIPHERAL GLAND: There is a small area of focal signal abnormality in the left  apex. Low ADC signal and slight hypervascularity. No other focal peripheral  zone abnormality is seen. -CAPSULE: Capsule appears intact.   No perineural tumor.  -SEMINAL VESICLES: Normal.     -LYMPH NODES: No significant adenopathy. -BONES: No bony lesions. -BLADDER/RECTUM: Normal.     IMPRESSION  IMPRESSION:   1. Small lesion in the left apex. PI-Rads 3-4  2. No evidence of adenopathy or metastatic disease in the lower abdomen/pelvis  Here for MRI fusion prostate biopsy.

## 2020-12-15 NOTE — ANESTHESIA PREPROCEDURE EVALUATION
Relevant Problems   No relevant active problems       Anesthetic History   No history of anesthetic complications            Review of Systems / Medical History  Patient summary reviewed and pertinent labs reviewed    Pulmonary  Within defined limits            Pertinent negatives: Smoker: ex.     Neuro/Psych   Within defined limits           Cardiovascular    Hypertension: well controlled          Hyperlipidemia    Exercise tolerance: >4 METS     GI/Hepatic/Renal     GERD: well controlled           Endo/Other    Diabetes (pre--diet controlled)         Other Findings              Physical Exam    Airway  Mallampati: I  TM Distance: 4 - 6 cm  Neck ROM: normal range of motion   Mouth opening: Normal     Cardiovascular  Regular rate and rhythm,  S1 and S2 normal,  no murmur, click, rub, or gallop  Rhythm: regular  Rate: normal         Dental    Dentition: Caps/crowns     Pulmonary  Breath sounds clear to auscultation               Abdominal  GI exam deferred       Other Findings            Anesthetic Plan    ASA: 2  Anesthesia type: total IV anesthesia          Induction: Intravenous  Anesthetic plan and risks discussed with: Patient and Spouse

## 2020-12-15 NOTE — BRIEF OP NOTE
Brief Postoperative Note    Patient: Amanuel Ramos III  YOB: 1955  MRN: [id number]    Date of Procedure: 12/15/2020     Pre-Op Diagnosis: Elevated PSA [R97.20]    Post-Op Diagnosis: Same as preoperative diagnosis.       Procedure(s):  MRI FUSION GUIDED PROSTATE BIOPSY    Surgeon(s):  Holland Abbasi MD    Surgical Assistant: None    Anesthesia: MAC     Estimated Blood Loss (mL): Minimal    Complications: None    Specimens:   ID Type Source Tests Collected by Time Destination   1 : FERNANDO #1 Preservative Prostate  Holland Abbasi MD 12/15/2020 3153 Pathology   2 : RB Preservative Prostate  Holland Abbasi MD 12/15/2020 8719 Pathology   3 : RM Preservative Prostate  Holland Abbasi MD 12/15/2020 9889 Pathology   4 : RA Preservative Prostate  Holland Abbasi MD 12/15/2020 0217 Pathology   5 : RLB Preservative Polly Abbasi MD 12/15/2020 2201 Pathology   6 : RLM Preservative Polly Abbasi MD 12/15/2020 8751 Pathology   7 : RLA Preservative Polly Abbasi MD 12/15/2020 0920 Pathology   8 : LB Preservative Prostate  Holland Abbasi MD 12/15/2020 0920 Pathology   9 : LM Preservative Polly Abbasi MD 12/15/2020 2120 Pathology   10 : LA Preservative Polly Abbasi MD 12/15/2020 2974 Pathology   11 : LLB Preservative Polly Abbasi MD 12/15/2020 0644 Pathology   12 : LLM Preservative Polly Abbasi MD 12/15/2020 1515 Pathology   13 : LLA Preservative Polly Abbasi MD 12/15/2020 8908 Pathology        Implants: * No implants in log *    Drains: * No LDAs found *    Findings: see op note    Electronically Signed by Rhea Gardner MD on 12/15/2020 at 9:29 AM

## 2020-12-15 NOTE — DISCHARGE INSTRUCTIONS
Patient Education        Prostate Biopsy: About This Test  What is it? A prostate biopsy is a type of test. Your doctor takes about 10 to 12 small tissue samples from your prostate. Then another doctor looks at the tissue under a microscope to see if there are cancer cells. Why is this test done? You may need a prostate biopsy if your doctor found something of concern in your lab work or during your exam. A biopsy can help find out if you have prostate cancer. It may also be done for other reasons, such as monitoring the growth of prostate cancer for men on active surveillance. How do you prepare for the test?  Before you have a prostate biopsy, tell your doctor if you are taking any medicines or using any herbal supplements, or if you are allergic to any medicines. And tell your doctor if you have had bleeding problems, or you take aspirin or some other blood thinner. How is the test done? Some men have a prostate imaging test, such as an MRI or a CT scan, before their biopsy. The test results are used during the biopsy to select the areas of the prostate to sample. Before your biopsy, you may be given antibiotics to prevent infection. You may be asked to take off all of your clothes and put on a hospital gown. You may be given a sedative through a vein (IV) in your arm. The sedative will help you relax and stay still. If you have a general anesthetic, you will be in a recovery room for a few hours after the biopsy. Through the rectum  · You may be asked to kneel, lie on your side, or lie on your back. · Your doctor may inject an anesthetic around and into the prostate to numb the area before samples are taken. · An ultrasound probe will be gently inserted into your rectum. · A thin tool with a spring-loaded needle will be inserted next to the ultrasound probe. The ultrasound helps to locate the areas on the prostate where the samples will be taken.  If you had an MRI or CT scan, the test results will also be used to guide the sampling. · The needle enters the prostate and removes a sample. About 10 to 12 samples are usually taken. Through the perineum  · You will lie on an exam table either on your side or on your back with your knees bent. You will get anesthesia. The anesthesia may make you sleep. Or it may just numb the area being worked on.  · Your doctor will make a small cut in your perineum. Then he or she will collect samples from the prostate through the cut with a special tool. · An ultrasound probe inserted into the rectum may be used to help find the locations in the prostate where samples need to be taken. Sometimes other imaging, such as MRI, is used instead of or along with ultrasound. Or the test results from other imaging, such as an MRI or a CT scan, may be used to guide the sampling. How long will the test take? This test will take from 15 to 45 minutes, depending on how it's done. What happens after the test?  You will probably be able to go home right away, and you may feel tired the rest of the day. Your muscles may ache. Don't do heavy work or exercise for 4 hours after the test.  You may have mild pain in your pelvic area and blood in your urine for up to 5 days. You may have some blood in your semen for a week or longer. You may also have a change in color of your semen for up to 1 month after the biopsy. If the prostate samples are taken through the rectal wall (transrectal biopsy), you may have a small amount of bleeding from your rectum for 2 to 3 days after the biopsy. Follow-up care is a key part of your treatment and safety. Be sure to make and go to all appointments, and call your doctor if you are having problems. It's also a good idea to keep a list of the medicines you take. Ask your doctor when you can expect to have your test results. Where can you learn more?   Go to http://www.gray.com/  Enter Y504 in the search box to learn more about \"Prostate Biopsy: About This Test.\"  Current as of: April 29, 2020               Content Version: 12.6  © 3763-2645 [employer]. Care instructions adapted under license by [employer] (which disclaims liability or warranty for this information). If you have questions about a medical condition or this instruction, always ask your healthcare professional. [address] any warranty or liability for your use of this information. DIET  · Clear liquids until no nausea or vomiting; then light diet for the first day. · Advance to regular diet on second day, unless your doctor orders otherwise. · If nausea and vomiting continues, call your doctor. AFTER ANESTHESIA   · For the first 24 hours: DO NOT Drive, Drink alcoholic beverages, or Make important decisions. · Be aware of dizziness following anesthesia and while taking pain medication. APPOINTMENT DATE/ TIME 1/4/21    YOUR DOCTOR'S PHONE NUMBER [phone]      DISCHARGE SUMMARY from Nurse    PATIENT INSTRUCTIONS:    After general anesthesia or intravenous sedation, for 24 hours or while taking prescription Narcotics:  · Limit your activities  · Do not drive and operate hazardous machinery  · Do not make important personal or business decisions  · Do  not drink alcoholic beverages  · If you have not urinated within 8 hours after discharge, please contact your surgeon on call. *  Please give a list of your current medications to your Primary Care Provider. *  Please update this list whenever your medications are discontinued, doses are      changed, or new medications (including over-the-counter products) are added. *  Please carry medication information at all times in case of emergency situations.       These are general instructions for a healthy lifestyle:    No smoking/ No tobacco products/ Avoid exposure to second hand smoke    Surgeon General's Warning:  Quitting smoking now greatly reduces serious risk to your health. Obesity, smoking, and sedentary lifestyle greatly increases your risk for illness    A healthy diet, regular physical exercise & weight monitoring are important for maintaining a healthy lifestyle    You may be retaining fluid if you have a history of heart failure or if you experience any of the following symptoms:  Weight gain of 3 pounds or more overnight or 5 pounds in a week, increased swelling in our hands or feet or shortness of breath while lying flat in bed. Please call your doctor as soon as you notice any of these symptoms; do not wait until your next office visit. Recognize signs and symptoms of STROKE:    F-face looks uneven    A-arms unable to move or move unevenly    S-speech slurred or non-existent    T-time-call 911 as soon as signs and symptoms begin-DO NOT go       Back to bed or wait to see if you get better-TIME IS BRAIN. Advance Care Planning  People with COVID-19 may have no symptoms, mild symptoms, such as fever, cough, and shortness of breath or they may have more severe illness, developing severe and fatal pneumonia. As a result, Advance Care Planning with attention to naming a health care decision maker (someone you trust to make healthcare decisions for you if you could not speak for yourself) and sharing other health care preferences is important BEFORE a possible health crisis. Please contact your Primary Care Provider to discuss Advance Care Planning.      Preventing the Spread of Coronavirus Disease 2019 in Homes and Residential Communities  For the most recent information go to [employer]    Prevention steps for People with confirmed or suspected COVID-19 (including persons under investigation) who do not need to be hospitalized  and   People with confirmed COVID-19 who were hospitalized and determined to be medically stable to go home    Your healthcare provider and public health staff will evaluate whether you can be cared for at home. If it is determined that you do not need to be hospitalized and can be isolated at home, you will be monitored by staff from your local or state health department. You should follow the prevention steps below until a healthcare provider or local or state health department says you can return to your normal activities. Stay home except to get medical care  People who are mildly ill with COVID-19 are able to isolate at home during their illness. You should restrict activities outside your home, except for getting medical care. Do not go to work, school, or public areas. Avoid using public transportation, ride-sharing, or taxis. Separate yourself from other people and animals in your home  People: As much as possible, you should stay in a specific room and away from other people in your home. Also, you should use a separate bathroom, if available. Animals: You should restrict contact with pets and other animals while you are sick with COVID-19, just like you would around other people. Although there have not been reports of pets or other animals becoming sick with COVID-19, it is still recommended that people sick with COVID-19 limit contact with animals until more information is known about the virus. When possible, have another member of your household care for your animals while you are sick. If you are sick with COVID-19, avoid contact with your pet, including petting, snuggling, being kissed or licked, and sharing food. If you must care for your pet or be around animals while you are sick, wash your hands before and after you interact with pets and wear a facemask. Call ahead before visiting your doctor  If you have a medical appointment, call the healthcare provider and tell them that you have or may have COVID-19. This will help the healthcare providers office take steps to keep other people from getting infected or exposed.   Wear a facemask  You should wear a facemask when you are around other people (e.g., sharing a room or vehicle) or pets and before you enter a healthcare providers office. If you are not able to wear a facemask (for example, because it causes trouble breathing), then people who live with you should not stay in the same room with you, or they should wear a facemask if they enter your room. Cover your coughs and sneezes  Cover your mouth and nose with a tissue when you cough or sneeze. Throw used tissues in a lined trash can. Immediately wash your hands with soap and water for at least 20 seconds or, if soap and water are not available, clean your hands with an alcohol-based hand  that contains at least 60% alcohol. Clean your hands often  Wash your hands often with soap and water for at least 20 seconds, especially after blowing your nose, coughing, or sneezing; going to the bathroom; and before eating or preparing food. If soap and water are not readily available, use an alcohol-based hand  with at least 60% alcohol, covering all surfaces of your hands and rubbing them together until they feel dry. Soap and water are the best option if hands are visibly dirty. Avoid touching your eyes, nose, and mouth with unwashed hands. Avoid sharing personal household items  You should not share dishes, drinking glasses, cups, eating utensils, towels, or bedding with other people or pets in your home. After using these items, they should be washed thoroughly with soap and water. Clean all high-touch surfaces everyday  High touch surfaces include counters, tabletops, doorknobs, bathroom fixtures, toilets, phones, keyboards, tablets, and bedside tables. Also, clean any surfaces that may have blood, stool, or body fluids on them. Use a household cleaning spray or wipe, according to the label instructions.  Labels contain instructions for safe and effective use of the cleaning product including precautions you should take when applying the product, such as wearing gloves and making sure you have good ventilation during use of the product. Monitor your symptoms  Seek prompt medical attention if your illness is worsening (e.g., difficulty breathing). Before seeking care, call your healthcare provider and tell them that you have, or are being evaluated for, COVID-19. Put on a facemask before you enter the facility. These steps will help the healthcare providers office to keep other people in the office or waiting room from getting infected or exposed. Ask your healthcare provider to call the local or state health department. Persons who are placed under active monitoring or facilitated self-monitoring should follow instructions provided by their local health department or occupational health professionals, as appropriate. When working with your local health department check their available hours. If you have a medical emergency and need to call 911, notify the dispatch personnel that you have, or are being evaluated for COVID-19. If possible, put on a facemask before emergency medical services arrive. Discontinuing home isolation  Patients with confirmed COVID-19 should remain under home isolation precautions until the risk of secondary transmission to others is thought to be low. The decision to discontinue home isolation precautions should be made on a case-by-case basis, in consultation with healthcare providers and state and local health departments.

## 2021-02-01 ENCOUNTER — HOSPITAL ENCOUNTER (OUTPATIENT)
Dept: RADIATION ONCOLOGY | Age: 66
Discharge: HOME | End: 2021-02-01
Payer: MEDICARE

## 2021-02-01 VITALS
TEMPERATURE: 97 F | SYSTOLIC BLOOD PRESSURE: 151 MMHG | WEIGHT: 198.9 LBS | DIASTOLIC BLOOD PRESSURE: 93 MMHG | HEART RATE: 79 BPM | RESPIRATION RATE: 16 BRPM | OXYGEN SATURATION: 100 % | BODY MASS INDEX: 27.74 KG/M2

## 2021-02-01 PROCEDURE — 99211 OFF/OP EST MAY X REQ PHY/QHP: CPT

## 2021-02-01 NOTE — CONSULTS
Patient: Vamshi Campbell MRN: [id number]  SSN: [id number]    YOB: 1955  Age: 72 y.o. Sex: male      Other Providers:  Dr. Thomas Lat: rising PSA    DIAGNOSIS: T1c, GS 3+4=7, PSA 5.1; unfavorable intermediate risk prostate cancer (due to % cores +)    PREVIOUS TREATMENT:  1) None    HISTORY OF PRESENT ILLNESS:  Kirt Lowry III is a 72 y.o. male who I am seeing at the request of Dr. Yvan Rivas. His oncologic history dates back this summer when he was found to have an elevated PSA of 5.1. He then underwent MRI pelvis which demonstrated a small lesion in the L apex (PIRADS 3-4). He then underwent MRI guided biopsy with pathology revealing GS 3+4=7 in the FERNANDO #1, RB, RM, & RLB; GS 3+3=6 in the LA and LLM. High grade PIN was found in the RLM, LLB, and LLM. Of note, the prostate measured 29 cc's. Symptomatically, he has minimal LUTS. His AUA score is 5. He has some ED. PAST MEDICAL HISTORY:    Past Medical History:   Diagnosis Date    Abnormal pulmonary function test 2/18/2015    2/18/15 Moderate restriction noted on Spirometry Smoked 1 ppd x 10 years and quit in 1983.  Dyslipidemia     Elevated PSA     Elevated serum GGT level     1/11/13      Essential hypertension, benign 2/18/2015    Family history of prostate cancer     His father had it in his mid-67s. Ulysses's last PSA in 6/14 was 2.2.    GERD (gastroesophageal reflux disease)     no meds    Kyphosis 2/18/2015    Microscopic hematuria 2/18/2015    Personal history of prostate cancer     Prediabetes 9/26/2016    Right inguinal hernia     Varicose veins of both legs with pain        The patient denies history of collagen vascular diseases, pacemaker insertion, prior radiation or prior chemotherapy.      PAST SURGICAL HISTORY:   Past Surgical History:   Procedure Laterality Date    BIOPSY PROSTATE      HX COLONOSCOPY  2/2011    HX HERNIA REPAIR Right 05/15/2019       MEDICATIONS:     Current Outpatient Medications:     atorvastatin (LIPITOR) 20 mg tablet, Take  by mouth daily. , Disp: , Rfl:     amLODIPine (NORVASC) 10 mg tablet, Take 1 Tab by mouth daily. (Patient taking differently: Take 10 mg by mouth nightly.), Disp: 90 Tab, Rfl: 1    enalapril (VASOTEC) 20 mg tablet, Take 1 Tab by mouth two (2) times a day. Indications: hypertension (Patient taking differently: Take 20 mg by mouth nightly. Indications: high blood pressure), Disp: 180 Tab, Rfl: 1    ALLERGIES:   Allergies   Allergen Reactions    Sulfa (Sulfonamide Antibiotics) Anaphylaxis       SOCIAL HISTORY:   Social History     Socioeconomic History    Marital status:      Spouse name: Salbador Aburto    Number of children: 2    Years of education: Not on file    Highest education level: Not on file   Occupational History    Occupation: Associate   Social Needs    Financial resource strain: Not on file    Food insecurity     Worry: Not on file     Inability: Not on file   "[employer] needs     Medical: Not on file     Non-medical: Not on file   Tobacco Use    Smoking status: Former Smoker     Packs/day: 1.00     Years: 10.00     Pack years: 10.00     Start date: 1973     Quit date: 1983     Years since quittin.1    Smokeless tobacco: Never Used   Substance and Sexual Activity    Alcohol use:  Yes     Alcohol/week: 21.0 standard drinks     Types: 21 Cans of beer per week    Drug use: No    Sexual activity: Yes     Partners: Female   Lifestyle    Physical activity     Days per week: Not on file     Minutes per session: Not on file    Stress: Not on file   Relationships    Social connections     Talks on phone: Not on file     Gets together: Not on file     Attends Confucianism service: Not on file     Active member of club or organization: Not on file     Attends meetings of clubs or organizations: Not on file     Relationship status: Not on file    Intimate partner violence     Fear of current or ex partner: Not on file     Emotionally abused: Not on file     Physically abused: Not on file     Forced sexual activity: Not on file   Other Topics Concern    Not on file   Social History Narrative    2/18/15 , works FT as an Associate at [employer], recently moved her from [place], [place] where he [address] for years. Father of Izabela Savage (9/2/92) and Dory Snow (3/16/84). FAMILY HISTORY:   Family History   Problem Relation Age of Onset    Hypertension Brother     Other Mother         Back Problems    Hypertension Father     Cancer Father 68        Prostate Cancer    Hypertension Brother        REVIEW OF SYSTEMS: Please see the completed review of systems sheet in the chart that I have reviewed today. PHYSICAL EXAMINATION:   ECOG Performance status 0  VITAL SIGNS:   Visit Vitals  BP (!) 151/93 (BP 1 Location: Left leg, BP Patient Position: Sitting)   Pulse 79   Temp 97 °F (36.1 °C)   Resp 16   Wt 90.2 kg (198 lb 14.4 oz)   SpO2 100%   BMI 27.74 kg/m²      General: well developed/nourished adult Male in no acute distress; appears stated age  [de-identified]: normocephalic, atraumatic; EOMI  Neck: supple with full ROM;    Respiratory: normal inspiratory effort, no audible wheezes  Extremities: no cyanosis, clubbing, or edema  Musculoskeletal: mobility intact x4; normal ROM in all joints  Skin: no skin lesions identified  Neuro: AOx3; sensation intact x 4; CNII-XII grossly intact  Psych: appropriate affect, insight, and judgement  GI: abdomen soft, non-distended    PATHOLOGY:    Pathology report reviewed - see HPI    LABORATORY:   Lab Results   Component Value Date/Time    Sodium 142 10/01/2018 08:35 AM    Potassium 5.1 10/01/2018 08:35 AM    Chloride 103 10/01/2018 08:35 AM    CO2 24 10/01/2018 08:35 AM    Glucose 111 (H) 10/01/2018 08:35 AM    BUN 16 10/01/2018 08:35 AM    Creatinine 0.89 10/01/2018 08:35 AM    GFR est  10/01/2018 08:35 AM    GFR est non-AA 91 10/01/2018 08:35 AM    Calcium 9.6 10/01/2018 08:35 AM    Albumin 4.7 10/09/2017 11:01 AM    Protein, total 7.2 10/09/2017 11:01 AM    A-G Ratio 1.9 10/09/2017 11:01 AM    ALT (SGPT) 24 10/09/2017 11:01 AM     Lab Results   Component Value Date/Time    WBC 4.8 10/09/2017 11:01 AM    HGB 14.3 05/15/2019 01:24 PM    HCT 40.7 10/09/2017 11:01 AM    PLATELET 958 76/23/6564 11:01 AM       RADIOLOGY:    No results found. IMPRESSION:  Ivone Jean Baptiste is a 72 y.o. male with unfavorable intermediate risk prostate cancer (due to volume of disease on biopsy). PLAN:    Given that he has unfavorable intermediate disease, I will order Decipher test to see if he needs ADT. If so, then I would treat him to 70 Gy in 28 fractions with 6 mos of ADT. If risk profile is low, then I would offer SBRT, 40 Gy in 5 fractions with SpaceOAR placement prior to treatment. I will see him in 3 weeks to discuss results and finalize treatment plan.     Mikey Varner MD   February 1, 2021

## 2021-02-01 NOTE — NURSE NAVIGATOR
Consult for prostate cancer. Aua score 5. MRI pelvis 10-19-20. Pathology 12-15-20.  psa 7/20 5.2. Decipher test ordered per Dr. Von Ortega. Pt will f/u in 3 weeks for results.       Flaco Curry RN

## 2021-02-22 ENCOUNTER — HOSPITAL ENCOUNTER (OUTPATIENT)
Dept: RADIATION ONCOLOGY | Age: 66
Discharge: HOME | End: 2021-02-22
Payer: MEDICARE

## 2021-02-22 VITALS
DIASTOLIC BLOOD PRESSURE: 91 MMHG | BODY MASS INDEX: 26.53 KG/M2 | HEART RATE: 84 BPM | OXYGEN SATURATION: 99 % | WEIGHT: 190.2 LBS | TEMPERATURE: 96.7 F | SYSTOLIC BLOOD PRESSURE: 155 MMHG

## 2021-02-22 PROCEDURE — 99211 OFF/OP EST MAY X REQ PHY/QHP: CPT

## 2021-02-22 NOTE — PROGRESS NOTES
Pt is here today for FUP to discuss the RT treatment plan. The Decipher results came back \"High Risk. \"  If pt chooses RT he will need Lupron for 6 months. Pt stated that he has been talking to his family members who are doctors and who have recommended surgery. At this time pt has not decided upon a treatment option and will call Rad Onc with his decision. Lupron has not been ordered. RT consents were not signed.

## 2021-02-22 NOTE — PROGRESS NOTES
Patient: Ivone Jean Baptiste MRN: [id number]  SSN: [id number]    YOB: 1955  Age: 72 y.o. Sex: male      Other Providers:  Dr. Jadiel Mustafa    DIAGNOSIS: T1c, GS 3+4=7, PSA 5.1; unfavorable intermediate risk prostate cancer (due to % cores +)    PREVIOUS TREATMENT:  1) None    INTERVAL HISTORY:  Mikey Varner III is a 72 y.o. male who is here for f/u after having Decipher testing. I reviewed this and he is high risk and thus would benefit from ADT with RT. No new symptoms reported. UPDATED PAST MEDICAL HISTORY:  Since our prior encounter, Mikey Varner III has not been hospitalized. There have been no significant changes to the medical history. MEDICATIONS:     Current Outpatient Medications:     atorvastatin (LIPITOR) 20 mg tablet, Take  by mouth daily. , Disp: , Rfl:     amLODIPine (NORVASC) 10 mg tablet, Take 1 Tab by mouth daily. (Patient taking differently: Take 10 mg by mouth nightly.), Disp: 90 Tab, Rfl: 1    enalapril (VASOTEC) 20 mg tablet, Take 1 Tab by mouth two (2) times a day. Indications: hypertension (Patient taking differently: Take 20 mg by mouth nightly. Indications: high blood pressure), Disp: 180 Tab, Rfl: 1    ALLERGIES:   Allergies   Allergen Reactions    Sulfa (Sulfonamide Antibiotics) Anaphylaxis       PHYSICAL EXAMINATION:   ECOG Performance status 0  VITAL SIGNS:   Visit Vitals  BP (!) 155/91   Pulse 84   Temp (!) 96.7 °F (35.9 °C)   Wt 86.3 kg (190 lb 3.2 oz)   SpO2 99%   BMI 26.53 kg/m²      General: well developed/nourished adult Male in no acute distress; appears stated age  [de-identified]: normocephalic, atraumatic; EOMI  Neck: supple with full ROM;    Respiratory: normal inspiratory effort, no audible wheezes  Extremities: no cyanosis, clubbing, or edema  Musculoskeletal: mobility intact x4; normal ROM in all joints  Skin: no skin lesions identified  Neuro: AOx3; sensation intact x 4; CNII-XII grossly intact  Psych: appropriate affect, insight, and judgement  GI: abdomen soft, non-distended    LABORATORY:   Lab Results   Component Value Date/Time    Sodium 142 10/01/2018 08:35 AM    Potassium 5.1 10/01/2018 08:35 AM    Chloride 103 10/01/2018 08:35 AM    CO2 24 10/01/2018 08:35 AM    Glucose 111 (H) 10/01/2018 08:35 AM    BUN 16 10/01/2018 08:35 AM    Creatinine 0.89 10/01/2018 08:35 AM    GFR est  10/01/2018 08:35 AM    GFR est non-AA 91 10/01/2018 08:35 AM    Calcium 9.6 10/01/2018 08:35 AM    Albumin 4.7 10/09/2017 11:01 AM    Protein, total 7.2 10/09/2017 11:01 AM    A-G Ratio 1.9 10/09/2017 11:01 AM    ALT (SGPT) 24 10/09/2017 11:01 AM     Lab Results   Component Value Date/Time    WBC 4.8 10/09/2017 11:01 AM    HGB 14.3 05/15/2019 01:24 PM    HCT 40.7 10/09/2017 11:01 AM    PLATELET 246 27/28/9995 11:01 AM       RADIOLOGY:  No results found. IMPRESSION:  Wilkins Bloch III is a 72 y.o. male unfavorable intermediate risk prostate cancer with a high risk Decipher score. PLAN:    I discussed that he would benefit from 6 mos of ADT with RT. He is having second thoughts regarding RT and is contemplating surgery. He will think it over and let us know. Should he get RT, I would treat him to 70 Gy in 28 fractions. Portions of this note were copied from prior encounters and reviewed for accuracy, currency, and represent documentation and tasks completed during this encounter. I verify and attest these portions to be unchanged from prior visits.       Wilkins Bloch, MD  02/22/21

## 2021-03-15 ENCOUNTER — HOSPITAL ENCOUNTER (OUTPATIENT)
Dept: RADIATION ONCOLOGY | Age: 66
Discharge: HOME | End: 2021-03-15
Payer: MEDICARE

## 2021-03-15 VITALS
DIASTOLIC BLOOD PRESSURE: 81 MMHG | SYSTOLIC BLOOD PRESSURE: 136 MMHG | OXYGEN SATURATION: 99 % | HEART RATE: 89 BPM | TEMPERATURE: 96.8 F

## 2021-03-15 DIAGNOSIS — C61 PROSTATE CANCER (HCC): Primary | ICD-10-CM

## 2021-03-22 ENCOUNTER — HOSPITAL ENCOUNTER (OUTPATIENT)
Dept: RADIATION ONCOLOGY | Age: 66
Discharge: HOME | End: 2021-03-22
Payer: MEDICARE

## 2021-03-22 DIAGNOSIS — C61 PROSTATE CANCER (HCC): ICD-10-CM

## 2021-03-22 PROCEDURE — 77470 SPECIAL RADIATION TREATMENT: CPT

## 2021-03-22 PROCEDURE — 84403 ASSAY OF TOTAL TESTOSTERONE: CPT

## 2021-03-22 PROCEDURE — 84153 ASSAY OF PSA TOTAL: CPT

## 2021-03-22 PROCEDURE — 36415 COLL VENOUS BLD VENIPUNCTURE: CPT

## 2021-03-22 RX ORDER — LEUPROLIDE ACETATE 45 MG
45 KIT INTRAMUSCULAR ONCE
Qty: 1 EACH | Refills: 0 | Status: SHIPPED | OUTPATIENT
Start: 2021-03-22 | End: 2021-03-22

## 2021-03-23 LAB
COMMENT, TESC2: NORMAL
PSA SERPL DL<=0.01 NG/ML-MCNC: 4.57 NG/ML (ref 0–4)
TESTOST SERPL-MCNC: 363 NG/DL (ref 264–916)

## 2021-03-30 ENCOUNTER — HOSPITAL ENCOUNTER (OUTPATIENT)
Dept: RADIATION ONCOLOGY | Age: 66
Discharge: HOME | End: 2021-03-30
Payer: MEDICARE

## 2021-03-30 PROCEDURE — 77399 UNLISTED PX MED RADJ PHYSICS: CPT

## 2021-03-30 PROCEDURE — 77301 RADIOTHERAPY DOSE PLAN IMRT: CPT

## 2021-03-30 PROCEDURE — 77300 RADIATION THERAPY DOSE PLAN: CPT

## 2021-04-02 ENCOUNTER — HOSPITAL ENCOUNTER (OUTPATIENT)
Dept: RADIATION ONCOLOGY | Age: 66
Discharge: HOME | End: 2021-04-02

## 2021-04-07 ENCOUNTER — HOSPITAL ENCOUNTER (OUTPATIENT)
Dept: RADIATION ONCOLOGY | Age: 66
Discharge: HOME | End: 2021-04-07
Payer: MEDICARE

## 2021-04-07 PROCEDURE — 77385 HC IMRT TRMT DLVR SMPL: CPT

## 2021-04-08 ENCOUNTER — HOSPITAL ENCOUNTER (OUTPATIENT)
Dept: RADIATION ONCOLOGY | Age: 66
Discharge: HOME | End: 2021-04-08
Payer: MEDICARE

## 2021-04-08 PROCEDURE — 77385 HC IMRT TRMT DLVR SMPL: CPT

## 2021-04-09 ENCOUNTER — HOSPITAL ENCOUNTER (OUTPATIENT)
Dept: RADIATION ONCOLOGY | Age: 66
Discharge: HOME | End: 2021-04-09
Payer: MEDICARE

## 2021-04-09 PROCEDURE — 77385 HC IMRT TRMT DLVR SMPL: CPT

## 2021-04-12 ENCOUNTER — HOSPITAL ENCOUNTER (OUTPATIENT)
Dept: RADIATION ONCOLOGY | Age: 66
Discharge: HOME | End: 2021-04-12
Payer: MEDICARE

## 2021-04-12 PROCEDURE — 77385 HC IMRT TRMT DLVR SMPL: CPT

## 2021-04-13 ENCOUNTER — HOSPITAL ENCOUNTER (OUTPATIENT)
Dept: RADIATION ONCOLOGY | Age: 66
Discharge: HOME | End: 2021-04-13
Payer: MEDICARE

## 2021-04-13 VITALS
BODY MASS INDEX: 27.85 KG/M2 | RESPIRATION RATE: 16 BRPM | HEART RATE: 77 BPM | SYSTOLIC BLOOD PRESSURE: 155 MMHG | WEIGHT: 199.7 LBS | DIASTOLIC BLOOD PRESSURE: 88 MMHG | OXYGEN SATURATION: 99 % | TEMPERATURE: 98.2 F

## 2021-04-13 PROCEDURE — 77336 RADIATION PHYSICS CONSULT: CPT

## 2021-04-13 PROCEDURE — 77385 HC IMRT TRMT DLVR SMPL: CPT

## 2021-04-13 NOTE — PROGRESS NOTES
Patient: Regi España MRN: [id number]  SSN: [id number]    YOB: 1955  Age: 72 y.o. Sex: male      DIAGNOSIS:  T1c, GS 3+4=7, PSA 5.1; unfavorable intermediate risk prostate cancer (due to % cores +)    TREATMENT SITE:  Prostate/SV with ADT    DOSE and FRACTIONATION:  5 of 28 fractions; 1250 cGy of 7000 cGy    INTERVAL HISTORY:  Mook Velasquez III is a 72 y.o. male being treated for prostate cancer. Week 1: no complaints      OBJECTIVE:  NAD  Visit Vitals  BP (!) 155/88 (BP 1 Location: Left upper arm, BP Patient Position: Sitting)   Pulse 77   Temp 98.2 °F (36.8 °C)   Resp 16   Wt 90.6 kg (199 lb 11.2 oz)   SpO2 99%   BMI 27.85 kg/m²       Lab Results   Component Value Date/Time    Sodium 142 10/01/2018 08:35 AM    Potassium 5.1 10/01/2018 08:35 AM    Chloride 103 10/01/2018 08:35 AM    CO2 24 10/01/2018 08:35 AM    Glucose 111 (H) 10/01/2018 08:35 AM    BUN 16 10/01/2018 08:35 AM    Creatinine 0.89 10/01/2018 08:35 AM    GFR est  10/01/2018 08:35 AM    GFR est non-AA 91 10/01/2018 08:35 AM    Calcium 9.6 10/01/2018 08:35 AM    Albumin 4.7 10/09/2017 11:01 AM    Protein, total 7.2 10/09/2017 11:01 AM    A-G Ratio 1.9 10/09/2017 11:01 AM    ALT (SGPT) 24 10/09/2017 11:01 AM     Lab Results   Component Value Date/Time    WBC 4.8 10/09/2017 11:01 AM    HGB 14.3 05/15/2019 01:24 PM    HCT 40.7 10/09/2017 11:01 AM    PLATELET 878 30/08/6520 11:01 AM       ASSESSMENT and PLAN:  Mook Velasquez III is tolerating radiation as anticipated for the current dose and fraction. We will continue on as planned with another treatment visit anticipated next week.         Mook Velasquez MD   April 13, 2021

## 2021-04-14 ENCOUNTER — HOSPITAL ENCOUNTER (OUTPATIENT)
Dept: RADIATION ONCOLOGY | Age: 66
Discharge: HOME | End: 2021-04-14
Payer: MEDICARE

## 2021-04-14 PROCEDURE — 77385 HC IMRT TRMT DLVR SMPL: CPT

## 2021-04-15 ENCOUNTER — HOSPITAL ENCOUNTER (OUTPATIENT)
Dept: RADIATION ONCOLOGY | Age: 66
Discharge: HOME | End: 2021-04-15
Payer: MEDICARE

## 2021-04-15 PROCEDURE — 77385 HC IMRT TRMT DLVR SMPL: CPT

## 2021-04-16 ENCOUNTER — HOSPITAL ENCOUNTER (OUTPATIENT)
Dept: RADIATION ONCOLOGY | Age: 66
Discharge: HOME | End: 2021-04-16
Payer: MEDICARE

## 2021-04-16 PROCEDURE — 77385 HC IMRT TRMT DLVR SMPL: CPT

## 2021-04-19 ENCOUNTER — HOSPITAL ENCOUNTER (OUTPATIENT)
Dept: RADIATION ONCOLOGY | Age: 66
Discharge: HOME | End: 2021-04-19
Payer: MEDICARE

## 2021-04-19 PROCEDURE — 77385 HC IMRT TRMT DLVR SMPL: CPT

## 2021-04-20 ENCOUNTER — HOSPITAL ENCOUNTER (OUTPATIENT)
Dept: RADIATION ONCOLOGY | Age: 66
Discharge: HOME | End: 2021-04-20
Payer: MEDICARE

## 2021-04-20 VITALS
RESPIRATION RATE: 16 BRPM | DIASTOLIC BLOOD PRESSURE: 93 MMHG | SYSTOLIC BLOOD PRESSURE: 150 MMHG | HEART RATE: 76 BPM | OXYGEN SATURATION: 100 % | BODY MASS INDEX: 27.88 KG/M2 | WEIGHT: 199.9 LBS | TEMPERATURE: 98.5 F

## 2021-04-20 PROCEDURE — 77385 HC IMRT TRMT DLVR SMPL: CPT

## 2021-04-20 NOTE — PROGRESS NOTES
Patient: Aime Santos MRN: [id number]  SSN: [id number]    YOB: 1955  Age: 72 y.o. Sex: male      DIAGNOSIS:  T1c, GS 3+4=7, PSA 5.1; unfavorable intermediate risk prostate cancer (due to % cores +)    TREATMENT SITE:  Prostate/SV with ADT    DOSE and FRACTIONATION:  10 of 28 fractions; 2500 cGy of 7000 cGy    INTERVAL HISTORY:  Nestor Mann III is a 72 y.o. male being treated for prostate cancer. Week 1: no complaints    Week 2: no complaints    OBJECTIVE:  NAD  Visit Vitals  BP (!) 150/93 (BP 1 Location: Left upper arm, BP Patient Position: Sitting)   Pulse 76   Temp 98.5 °F (36.9 °C)   Resp 16   Wt 90.7 kg (199 lb 14.4 oz)   SpO2 100%   BMI 27.88 kg/m²       Lab Results   Component Value Date/Time    Sodium 142 10/01/2018 08:35 AM    Potassium 5.1 10/01/2018 08:35 AM    Chloride 103 10/01/2018 08:35 AM    CO2 24 10/01/2018 08:35 AM    Glucose 111 (H) 10/01/2018 08:35 AM    BUN 16 10/01/2018 08:35 AM    Creatinine 0.89 10/01/2018 08:35 AM    GFR est  10/01/2018 08:35 AM    GFR est non-AA 91 10/01/2018 08:35 AM    Calcium 9.6 10/01/2018 08:35 AM    Albumin 4.7 10/09/2017 11:01 AM    Protein, total 7.2 10/09/2017 11:01 AM    A-G Ratio 1.9 10/09/2017 11:01 AM    ALT (SGPT) 24 10/09/2017 11:01 AM     Lab Results   Component Value Date/Time    WBC 4.8 10/09/2017 11:01 AM    HGB 14.3 05/15/2019 01:24 PM    HCT 40.7 10/09/2017 11:01 AM    PLATELET 587 62/91/5018 11:01 AM       ASSESSMENT and PLAN:  Nestor Mann III is tolerating radiation as anticipated for the current dose and fraction. We will continue on as planned with another treatment visit anticipated next week.         Nestor Mann MD   April 20, 2021

## 2021-04-21 ENCOUNTER — HOSPITAL ENCOUNTER (OUTPATIENT)
Dept: RADIATION ONCOLOGY | Age: 66
Discharge: HOME | End: 2021-04-21
Payer: MEDICARE

## 2021-04-21 PROCEDURE — 77336 RADIATION PHYSICS CONSULT: CPT

## 2021-04-21 PROCEDURE — 77385 HC IMRT TRMT DLVR SMPL: CPT

## 2021-04-22 ENCOUNTER — HOSPITAL ENCOUNTER (OUTPATIENT)
Dept: RADIATION ONCOLOGY | Age: 66
Discharge: HOME | End: 2021-04-22
Payer: MEDICARE

## 2021-04-22 PROCEDURE — 77385 HC IMRT TRMT DLVR SMPL: CPT

## 2021-04-23 ENCOUNTER — HOSPITAL ENCOUNTER (OUTPATIENT)
Dept: RADIATION ONCOLOGY | Age: 66
Discharge: HOME | End: 2021-04-23
Payer: MEDICARE

## 2021-04-23 PROCEDURE — 77385 HC IMRT TRMT DLVR SMPL: CPT

## 2021-04-26 ENCOUNTER — HOSPITAL ENCOUNTER (OUTPATIENT)
Dept: RADIATION ONCOLOGY | Age: 66
Discharge: HOME | End: 2021-04-26
Payer: MEDICARE

## 2021-04-26 PROCEDURE — 77385 HC IMRT TRMT DLVR SMPL: CPT

## 2021-04-27 ENCOUNTER — HOSPITAL ENCOUNTER (OUTPATIENT)
Dept: RADIATION ONCOLOGY | Age: 66
Discharge: HOME | End: 2021-04-27
Payer: MEDICARE

## 2021-04-27 VITALS
BODY MASS INDEX: 27.84 KG/M2 | OXYGEN SATURATION: 97 % | DIASTOLIC BLOOD PRESSURE: 91 MMHG | RESPIRATION RATE: 16 BRPM | SYSTOLIC BLOOD PRESSURE: 157 MMHG | HEART RATE: 80 BPM | TEMPERATURE: 98.5 F | WEIGHT: 199.6 LBS

## 2021-04-27 PROCEDURE — 77385 HC IMRT TRMT DLVR SMPL: CPT

## 2021-04-27 NOTE — PROGRESS NOTES
Patient: Harshil Hurst MRN: [id number]  SSN: [id number]    YOB: 1955  Age: 72 y.o. Sex: male      DIAGNOSIS:  T1c, GS 3+4=7, PSA 5.1; unfavorable intermediate risk prostate cancer (due to % cores +)    TREATMENT SITE:  Prostate/SV with ADT    DOSE and FRACTIONATION:  15 of 28 fractions; 3750 cGy of 7000 cGy    INTERVAL HISTORY:  Chey George III is a 72 y.o. male being treated for prostate cancer. Week 1: no complaints    Week 2: no complaints  Week 3: notes loose stool    OBJECTIVE:  NAD  Visit Vitals  BP (!) 157/91 (BP 1 Location: Left upper arm, BP Patient Position: Sitting)   Pulse 80   Temp 98.5 °F (36.9 °C)   Resp 16   Wt 90.5 kg (199 lb 9.6 oz)   SpO2 97%   BMI 27.84 kg/m²       Lab Results   Component Value Date/Time    Sodium 142 10/01/2018 08:35 AM    Potassium 5.1 10/01/2018 08:35 AM    Chloride 103 10/01/2018 08:35 AM    CO2 24 10/01/2018 08:35 AM    Glucose 111 (H) 10/01/2018 08:35 AM    BUN 16 10/01/2018 08:35 AM    Creatinine 0.89 10/01/2018 08:35 AM    GFR est  10/01/2018 08:35 AM    GFR est non-AA 91 10/01/2018 08:35 AM    Calcium 9.6 10/01/2018 08:35 AM    Albumin 4.7 10/09/2017 11:01 AM    Protein, total 7.2 10/09/2017 11:01 AM    A-G Ratio 1.9 10/09/2017 11:01 AM    ALT (SGPT) 24 10/09/2017 11:01 AM     Lab Results   Component Value Date/Time    WBC 4.8 10/09/2017 11:01 AM    HGB 14.3 05/15/2019 01:24 PM    HCT 40.7 10/09/2017 11:01 AM    PLATELET 676 84/82/7238 11:01 AM       ASSESSMENT and PLAN:  Chey George III is tolerating radiation as anticipated for the current dose and fraction. We will continue on as planned with another treatment visit anticipated next week. Imodium recommended prn.        Chey George MD   April 27, 2021

## 2021-04-28 ENCOUNTER — HOSPITAL ENCOUNTER (OUTPATIENT)
Dept: RADIATION ONCOLOGY | Age: 66
Discharge: HOME | End: 2021-04-28
Payer: MEDICARE

## 2021-04-28 PROCEDURE — 77385 HC IMRT TRMT DLVR SMPL: CPT

## 2021-04-28 PROCEDURE — 77336 RADIATION PHYSICS CONSULT: CPT

## 2021-04-29 ENCOUNTER — HOSPITAL ENCOUNTER (OUTPATIENT)
Dept: RADIATION ONCOLOGY | Age: 66
Discharge: HOME | End: 2021-04-29
Payer: MEDICARE

## 2021-04-29 PROCEDURE — 77385 HC IMRT TRMT DLVR SMPL: CPT

## 2021-04-30 ENCOUNTER — HOSPITAL ENCOUNTER (OUTPATIENT)
Dept: RADIATION ONCOLOGY | Age: 66
Discharge: HOME | End: 2021-04-30
Payer: MEDICARE

## 2021-04-30 PROCEDURE — 77385 HC IMRT TRMT DLVR SMPL: CPT

## 2021-05-03 ENCOUNTER — HOSPITAL ENCOUNTER (OUTPATIENT)
Dept: RADIATION ONCOLOGY | Age: 66
Discharge: HOME | End: 2021-05-03
Payer: MEDICARE

## 2021-05-03 PROCEDURE — 77385 HC IMRT TRMT DLVR SMPL: CPT

## 2021-05-04 ENCOUNTER — HOSPITAL ENCOUNTER (OUTPATIENT)
Dept: RADIATION ONCOLOGY | Age: 66
Discharge: HOME | End: 2021-05-04
Payer: MEDICARE

## 2021-05-04 VITALS
BODY MASS INDEX: 27.46 KG/M2 | DIASTOLIC BLOOD PRESSURE: 90 MMHG | SYSTOLIC BLOOD PRESSURE: 142 MMHG | WEIGHT: 196.9 LBS | HEART RATE: 81 BPM | TEMPERATURE: 98.2 F | OXYGEN SATURATION: 99 %

## 2021-05-04 PROCEDURE — 77385 HC IMRT TRMT DLVR SMPL: CPT

## 2021-05-04 NOTE — PROGRESS NOTES
Patient: Regi España MRN: [id number]  SSN: [id number]    YOB: 1955  Age: 72 y.o. Sex: male      DIAGNOSIS:  T1c, GS 3+4=7, PSA 5.1; unfavorable intermediate risk prostate cancer (due to % cores +)    TREATMENT SITE:  Prostate/SV with ADT    DOSE and FRACTIONATION:  20 of 28 fractions; 5000 cGy of 7000 cGy    INTERVAL HISTORY:  Mook Velasquez III is a 72 y.o. male being treated for prostate cancer. Week 1: no complaints    Week 2: no complaints  Week 3: notes loose stool  Week 4: no complaints    OBJECTIVE:  NAD  Visit Vitals  BP (!) 142/90 (BP 1 Location: Right upper arm, BP Patient Position: Sitting)   Pulse 81   Temp 98.2 °F (36.8 °C)   Wt 89.3 kg (196 lb 14.4 oz)   SpO2 99%   BMI 27.46 kg/m²       Lab Results   Component Value Date/Time    Sodium 142 10/01/2018 08:35 AM    Potassium 5.1 10/01/2018 08:35 AM    Chloride 103 10/01/2018 08:35 AM    CO2 24 10/01/2018 08:35 AM    Glucose 111 (H) 10/01/2018 08:35 AM    BUN 16 10/01/2018 08:35 AM    Creatinine 0.89 10/01/2018 08:35 AM    GFR est  10/01/2018 08:35 AM    GFR est non-AA 91 10/01/2018 08:35 AM    Calcium 9.6 10/01/2018 08:35 AM    Albumin 4.7 10/09/2017 11:01 AM    Protein, total 7.2 10/09/2017 11:01 AM    A-G Ratio 1.9 10/09/2017 11:01 AM    ALT (SGPT) 24 10/09/2017 11:01 AM     Lab Results   Component Value Date/Time    WBC 4.8 10/09/2017 11:01 AM    HGB 14.3 05/15/2019 01:24 PM    HCT 40.7 10/09/2017 11:01 AM    PLATELET 415 18/47/8877 11:01 AM       ASSESSMENT and PLAN:  Mook Velasquez III is tolerating radiation as anticipated for the current dose and fraction. We will continue on as planned with another treatment visit anticipated next week. Imodium recommended prn.        Mook Velasquez MD   May 4, 2021

## 2021-05-05 ENCOUNTER — HOSPITAL ENCOUNTER (OUTPATIENT)
Dept: RADIATION ONCOLOGY | Age: 66
Discharge: HOME | End: 2021-05-05
Payer: MEDICARE

## 2021-05-05 PROCEDURE — 77385 HC IMRT TRMT DLVR SMPL: CPT

## 2021-05-05 PROCEDURE — 77336 RADIATION PHYSICS CONSULT: CPT

## 2021-05-06 ENCOUNTER — HOSPITAL ENCOUNTER (OUTPATIENT)
Dept: RADIATION ONCOLOGY | Age: 66
Discharge: HOME | End: 2021-05-06
Payer: MEDICARE

## 2021-05-06 PROCEDURE — 77385 HC IMRT TRMT DLVR SMPL: CPT

## 2021-05-07 ENCOUNTER — HOSPITAL ENCOUNTER (OUTPATIENT)
Dept: RADIATION ONCOLOGY | Age: 66
Discharge: HOME | End: 2021-05-07
Payer: MEDICARE

## 2021-05-07 PROCEDURE — 77385 HC IMRT TRMT DLVR SMPL: CPT

## 2021-05-10 ENCOUNTER — HOSPITAL ENCOUNTER (OUTPATIENT)
Dept: RADIATION ONCOLOGY | Age: 66
Discharge: HOME | End: 2021-05-10
Payer: MEDICARE

## 2021-05-10 DIAGNOSIS — C61 PROSTATE CANCER (HCC): Primary | ICD-10-CM

## 2021-05-10 PROCEDURE — 77385 HC IMRT TRMT DLVR SMPL: CPT

## 2021-05-11 ENCOUNTER — HOSPITAL ENCOUNTER (OUTPATIENT)
Dept: RADIATION ONCOLOGY | Age: 66
Discharge: HOME | End: 2021-05-11
Payer: MEDICARE

## 2021-05-11 VITALS
HEART RATE: 90 BPM | OXYGEN SATURATION: 97 % | DIASTOLIC BLOOD PRESSURE: 97 MMHG | SYSTOLIC BLOOD PRESSURE: 161 MMHG | WEIGHT: 193.6 LBS | BODY MASS INDEX: 27 KG/M2 | TEMPERATURE: 99.1 F

## 2021-05-11 PROCEDURE — 77385 HC IMRT TRMT DLVR SMPL: CPT

## 2021-05-11 RX ORDER — TAMSULOSIN HYDROCHLORIDE 0.4 MG/1
0.4 CAPSULE ORAL DAILY
Qty: 30 CAP | Refills: 5 | Status: SHIPPED | OUTPATIENT
Start: 2021-05-11 | End: 2021-05-17

## 2021-05-11 NOTE — PROGRESS NOTES
Patient: Roderick Marx MRN: [id number]  SSN: [id number]    YOB: 1955  Age: 77 y.o. Sex: male      DIAGNOSIS:  T1c, GS 3+4=7, PSA 5.1; unfavorable intermediate risk prostate cancer (due to % cores +)    TREATMENT SITE:  Prostate/SV with ADT    DOSE and FRACTIONATION:  25 of 28 fractions; 6250 cGy of 7000 cGy    INTERVAL HISTORY:  Neha Hahn III is a 77 y.o. male being treated for prostate cancer. Week 1: no complaints    Week 2: no complaints  Week 3: notes loose stool  Week 4: no complaints  Week 5: increased nocturia    OBJECTIVE:  NAD  Visit Vitals  BP (!) 161/97   Pulse 90   Temp 99.1 °F (37.3 °C)   Wt 87.8 kg (193 lb 9.6 oz)   SpO2 97%   BMI 27.00 kg/m²       Lab Results   Component Value Date/Time    Sodium 142 10/01/2018 08:35 AM    Potassium 5.1 10/01/2018 08:35 AM    Chloride 103 10/01/2018 08:35 AM    CO2 24 10/01/2018 08:35 AM    Glucose 111 (H) 10/01/2018 08:35 AM    BUN 16 10/01/2018 08:35 AM    Creatinine 0.89 10/01/2018 08:35 AM    GFR est  10/01/2018 08:35 AM    GFR est non-AA 91 10/01/2018 08:35 AM    Calcium 9.6 10/01/2018 08:35 AM    Albumin 4.7 10/09/2017 11:01 AM    Protein, total 7.2 10/09/2017 11:01 AM    A-G Ratio 1.9 10/09/2017 11:01 AM    ALT (SGPT) 24 10/09/2017 11:01 AM     Lab Results   Component Value Date/Time    WBC 4.8 10/09/2017 11:01 AM    HGB 14.3 05/15/2019 01:24 PM    HCT 40.7 10/09/2017 11:01 AM    PLATELET 334 76/71/2132 11:01 AM       ASSESSMENT and PLAN:  Neha Hahn III is tolerating radiation as anticipated for the current dose and fraction. We will continue on as planned with another treatment visit anticipated next week. Flomax prescribed.       Neha Hahn MD   May 11, 2021

## 2021-05-12 ENCOUNTER — HOSPITAL ENCOUNTER (OUTPATIENT)
Dept: RADIATION ONCOLOGY | Age: 66
Discharge: HOME | End: 2021-05-12
Payer: MEDICARE

## 2021-05-12 PROCEDURE — 77336 RADIATION PHYSICS CONSULT: CPT

## 2021-05-12 PROCEDURE — 77385 HC IMRT TRMT DLVR SMPL: CPT

## 2021-05-13 ENCOUNTER — HOSPITAL ENCOUNTER (OUTPATIENT)
Dept: RADIATION ONCOLOGY | Age: 66
Discharge: HOME | End: 2021-05-13
Payer: MEDICARE

## 2021-05-13 PROCEDURE — 77385 HC IMRT TRMT DLVR SMPL: CPT

## 2021-05-13 RX ORDER — METHYLPREDNISOLONE 4 MG/1
8 TABLET ORAL
Qty: 1 DOSE PACK | Refills: 0 | Status: SHIPPED | OUTPATIENT
Start: 2021-05-13

## 2021-05-14 ENCOUNTER — HOSPITAL ENCOUNTER (OUTPATIENT)
Dept: RADIATION ONCOLOGY | Age: 66
Discharge: HOME | End: 2021-05-14
Payer: MEDICARE

## 2021-05-14 VITALS
WEIGHT: 196.5 LBS | SYSTOLIC BLOOD PRESSURE: 172 MMHG | HEART RATE: 80 BPM | TEMPERATURE: 98.2 F | DIASTOLIC BLOOD PRESSURE: 98 MMHG | BODY MASS INDEX: 27.41 KG/M2 | OXYGEN SATURATION: 98 % | RESPIRATION RATE: 16 BRPM

## 2021-05-14 PROCEDURE — 77385 HC IMRT TRMT DLVR SMPL: CPT

## 2021-05-14 NOTE — PROGRESS NOTES
Patient: Regi España MRN: [id number]  SSN: [id number]    YOB: 1955  Age: 77 y.o. Sex: male      DIAGNOSIS:  T1c, GS 3+4=7, PSA 5.1; unfavorable intermediate risk prostate cancer (due to % cores +)    TREATMENT SITE:  Prostate/SV with ADT    DOSE and FRACTIONATION:  28 of 28 fractions; 7000 cGy of 7000 cGy    INTERVAL HISTORY:  Mook Velasquez III is a 77 y.o. male being treated for prostate cancer. Week 1: no complaints    Week 2: no complaints  Week 3: notes loose stool  Week 4: no complaints  Week 5: increased nocturia  Week 6: nocturia; didn't  medrol dose pack    OBJECTIVE:  NAD  Visit Vitals  BP (!) 172/98 (BP 1 Location: Left upper arm, BP Patient Position: Sitting)   Pulse 80   Temp 98.2 °F (36.8 °C)   Resp 16   Wt 89.1 kg (196 lb 8 oz)   SpO2 98%   BMI 27.41 kg/m²       Lab Results   Component Value Date/Time    Sodium 142 10/01/2018 08:35 AM    Potassium 5.1 10/01/2018 08:35 AM    Chloride 103 10/01/2018 08:35 AM    CO2 24 10/01/2018 08:35 AM    Glucose 111 (H) 10/01/2018 08:35 AM    BUN 16 10/01/2018 08:35 AM    Creatinine 0.89 10/01/2018 08:35 AM    GFR est  10/01/2018 08:35 AM    GFR est non-AA 91 10/01/2018 08:35 AM    Calcium 9.6 10/01/2018 08:35 AM    Albumin 4.7 10/09/2017 11:01 AM    Protein, total 7.2 10/09/2017 11:01 AM    A-G Ratio 1.9 10/09/2017 11:01 AM    ALT (SGPT) 24 10/09/2017 11:01 AM     Lab Results   Component Value Date/Time    WBC 4.8 10/09/2017 11:01 AM    HGB 14.3 05/15/2019 01:24 PM    HCT 40.7 10/09/2017 11:01 AM    PLATELET 546 05/91/1540 11:01 AM       ASSESSMENT and PLAN:  Mook Velasquez III is tolerating radiation as anticipated for the current dose and fraction. F/U in 6 mos.       Mook Velasquez MD   May 14, 2021

## 2021-05-17 ENCOUNTER — HOSPITAL ENCOUNTER (OUTPATIENT)
Dept: RADIATION ONCOLOGY | Age: 66
Discharge: HOME | End: 2021-05-17
Payer: MEDICARE

## 2021-05-17 PROCEDURE — 77336 RADIATION PHYSICS CONSULT: CPT

## 2021-05-17 RX ORDER — ALFUZOSIN HYDROCHLORIDE 10 MG/1
10 TABLET, EXTENDED RELEASE ORAL DAILY
Qty: 30 TAB | Refills: 5 | Status: SHIPPED | OUTPATIENT
Start: 2021-05-17 | End: 2021-12-14 | Stop reason: SDUPTHER

## 2021-11-29 ENCOUNTER — HOSPITAL ENCOUNTER (OUTPATIENT)
Dept: RADIATION ONCOLOGY | Age: 66
Discharge: HOME | End: 2021-11-29
Payer: MEDICARE

## 2021-11-29 DIAGNOSIS — C61 PROSTATE CANCER (HCC): ICD-10-CM

## 2021-11-29 PROCEDURE — 84153 ASSAY OF PSA TOTAL: CPT

## 2021-11-29 PROCEDURE — 36415 COLL VENOUS BLD VENIPUNCTURE: CPT

## 2021-11-29 PROCEDURE — 84403 ASSAY OF TOTAL TESTOSTERONE: CPT

## 2021-11-30 LAB
PSA SERPL DL<=0.01 NG/ML-MCNC: 0.02 NG/ML (ref 0–4)
TESTOST SERPL-MCNC: 10 NG/DL (ref 264–916)

## 2021-12-14 RX ORDER — ALFUZOSIN HYDROCHLORIDE 10 MG/1
10 TABLET, EXTENDED RELEASE ORAL DAILY
Qty: 30 TABLET | Refills: 5 | Status: SHIPPED | OUTPATIENT
Start: 2021-12-14

## 2022-03-19 PROBLEM — K40.90 RIGHT INGUINAL HERNIA: Status: ACTIVE | Noted: 2019-04-11

## 2022-03-19 PROBLEM — I10 ESSENTIAL HYPERTENSION: Status: ACTIVE | Noted: 2018-04-09

## 2022-04-20 DIAGNOSIS — C61 PROSTATE CANCER (HCC): Primary | ICD-10-CM

## 2022-06-09 ENCOUNTER — APPOINTMENT (OUTPATIENT)
Dept: RADIATION ONCOLOGY | Age: 67
End: 2022-06-09

## 2022-06-10 ENCOUNTER — HOSPITAL ENCOUNTER (OUTPATIENT)
Dept: RADIATION ONCOLOGY | Age: 67
Setting detail: RECURRING SERIES
Discharge: HOME | End: 2022-06-13
Payer: MEDICARE

## 2022-06-10 DIAGNOSIS — C61 PROSTATE CANCER (HCC): ICD-10-CM

## 2022-06-10 PROCEDURE — 84403 ASSAY OF TOTAL TESTOSTERONE: CPT

## 2022-06-10 PROCEDURE — 84153 ASSAY OF PSA TOTAL: CPT

## 2022-06-10 PROCEDURE — 36415 COLL VENOUS BLD VENIPUNCTURE: CPT

## 2022-06-11 LAB
PSA SERPL DL<=0.01 NG/ML-MCNC: 0.08 NG/ML (ref 0–4)
TESTOST SERPL-MCNC: 299 NG/DL (ref 264–916)

## 2022-06-16 ENCOUNTER — APPOINTMENT (OUTPATIENT)
Dept: RADIATION ONCOLOGY | Age: 67
End: 2022-06-16

## 2022-06-17 DIAGNOSIS — C61 PROSTATE CANCER (HCC): Primary | ICD-10-CM

## 2022-12-08 ENCOUNTER — APPOINTMENT (OUTPATIENT)
Dept: RADIATION ONCOLOGY | Age: 67
End: 2022-12-08
Attending: RADIOLOGY
Payer: MEDICARE

## 2022-12-09 ENCOUNTER — HOSPITAL ENCOUNTER (OUTPATIENT)
Dept: RADIATION ONCOLOGY | Age: 67
Setting detail: RECURRING SERIES
Discharge: HOME | End: 2022-12-12
Attending: RADIOLOGY
Payer: MEDICARE

## 2022-12-09 DIAGNOSIS — C61 PROSTATE CANCER (HCC): ICD-10-CM

## 2022-12-09 PROCEDURE — 36415 COLL VENOUS BLD VENIPUNCTURE: CPT

## 2022-12-09 PROCEDURE — 84153 ASSAY OF PSA TOTAL: CPT

## 2022-12-09 PROCEDURE — 84403 ASSAY OF TOTAL TESTOSTERONE: CPT

## 2022-12-10 LAB
PSA SERPL DL<=0.01 NG/ML-MCNC: 0.19 NG/ML (ref 0–4)
TESTOST SERPL-MCNC: 288 NG/DL (ref 264–916)

## 2022-12-14 DIAGNOSIS — C61 PROSTATE CANCER (HCC): Primary | ICD-10-CM

## 2022-12-15 ENCOUNTER — APPOINTMENT (OUTPATIENT)
Dept: RADIATION ONCOLOGY | Age: 67
End: 2022-12-15
Attending: RADIOLOGY
Payer: MEDICARE

## 2023-06-06 ENCOUNTER — HOSPITAL ENCOUNTER (OUTPATIENT)
Dept: RADIATION ONCOLOGY | Age: 68
Setting detail: RECURRING SERIES
Discharge: HOME | End: 2023-06-09
Attending: RADIOLOGY
Payer: MEDICARE

## 2023-06-06 DIAGNOSIS — C61 PROSTATE CANCER (HCC): ICD-10-CM

## 2023-06-06 PROCEDURE — 36415 COLL VENOUS BLD VENIPUNCTURE: CPT

## 2023-06-06 PROCEDURE — 84403 ASSAY OF TOTAL TESTOSTERONE: CPT

## 2023-06-06 PROCEDURE — 84153 ASSAY OF PSA TOTAL: CPT

## 2023-06-08 ENCOUNTER — APPOINTMENT (OUTPATIENT)
Dept: RADIATION ONCOLOGY | Age: 68
End: 2023-06-08
Attending: RADIOLOGY
Payer: MEDICARE

## 2023-06-08 LAB
PSA SERPL DL<=0.01 NG/ML-MCNC: 0.11 NG/ML (ref 0–4)
TESTOST SERPL-MCNC: 306 NG/DL (ref 264–916)

## 2023-06-22 ENCOUNTER — APPOINTMENT (OUTPATIENT)
Dept: RADIATION ONCOLOGY | Age: 68
End: 2023-06-22
Attending: RADIOLOGY
Payer: MEDICARE

## 2023-07-05 ENCOUNTER — APPOINTMENT (OUTPATIENT)
Dept: RADIATION ONCOLOGY | Age: 68
End: 2023-07-05
Attending: RADIOLOGY
Payer: MEDICARE

## 2023-12-14 ENCOUNTER — APPOINTMENT (OUTPATIENT)
Dept: RADIATION ONCOLOGY | Age: 68
End: 2023-12-14
Attending: RADIOLOGY
Payer: MEDICARE

## 2023-12-21 ENCOUNTER — APPOINTMENT (OUTPATIENT)
Dept: RADIATION ONCOLOGY | Age: 68
End: 2023-12-21
Attending: RADIOLOGY
Payer: MEDICARE

## 2023-12-21 ENCOUNTER — HOSPITAL ENCOUNTER (OUTPATIENT)
Dept: RADIATION ONCOLOGY | Age: 68
Setting detail: RECURRING SERIES
Discharge: HOME | End: 2023-12-24
Attending: RADIOLOGY
Payer: MEDICARE

## 2023-12-21 NOTE — PROGRESS NOTES
leakage no. GI ROS: regular yes. ; pain with bowel movements no.; bleeding no. Misc ROS: NA; all other review of systems are otherwise negative. MEDICATIONS:     Current Outpatient Medications:     alfuzosin (UROXATRAL) 10 MG extended release tablet, Take 10 mg by mouth daily, Disp: , Rfl:     amLODIPine (NORVASC) 10 MG tablet, Take 10 mg by mouth daily, Disp: , Rfl:     atorvastatin (LIPITOR) 20 MG tablet, Take by mouth daily, Disp: , Rfl:     enalapril (VASOTEC) 20 MG tablet, Take 20 mg by mouth 2 times daily, Disp: , Rfl:     methylPREDNISolone (MEDROL DOSEPACK) 4 MG tablet, Take 8 mg by mouth, Disp: , Rfl:     ALLERGIES:   Allergies   Allergen Reactions    Sulfa Antibiotics Anaphylaxis       PHYSICAL EXAMINATION:   Phone visit. LABORATORY: No results found for: \"NA\", \"K\", \"CL\", \"CO2\", \"AGAP\", \"GLU\", \"BUN\", \"CREA\", \"GFRAA\", \"CA\", \"MG\", \"PHOS\", \"ALB\", \"TP\", \"GLOB\", \"ALT\"  No results found for: \"WBC\", \"HGB\", \"HCT\", \"PLT\"    RADIOLOGY:  No new. IMPRESSION:  Eloisa Lam III is a 76 y.o. male w/ a hx of prostate cancer T1c, GS 3+4=7, PSA 5.1; unfavorable intermediate risk prostate cancer (due to % cores +)    PLAN:    1) Patient is recovering as anticipated from his prior course of radiotherapy. 2) Future follow up will be coordinated with other providers. 3) Phone visit 6 months with PSA 1 week prior- Please schedule labs on Tuesday or Friday. Please also arrange phone visit for a Tuesday as patient is off this day.      I spent a total of 11 minutes today performing the following care related activities for Eloisa Lam III: /Educate Patient/Caregivers, Pre-Charting/Documenting after the visit, and Interpreting/Ordering Meds, Tests, Procedures    Suad Du MD  12/21/23

## 2024-06-18 ENCOUNTER — HOSPITAL ENCOUNTER (OUTPATIENT)
Dept: RADIATION ONCOLOGY | Age: 69
Setting detail: RECURRING SERIES
Discharge: HOME | End: 2024-06-21
Attending: RADIOLOGY
Payer: MEDICARE

## 2024-06-18 DIAGNOSIS — C61 PROSTATE CANCER (HCC): ICD-10-CM

## 2024-06-18 LAB — PSA SERPL-MCNC: <0.1 NG/ML (ref 0–4)

## 2024-06-18 PROCEDURE — 84153 ASSAY OF PSA TOTAL: CPT

## 2024-06-18 PROCEDURE — 36415 COLL VENOUS BLD VENIPUNCTURE: CPT

## 2024-06-25 ENCOUNTER — APPOINTMENT (OUTPATIENT)
Dept: RADIATION ONCOLOGY | Age: 69
End: 2024-06-25
Attending: RADIOLOGY
Payer: MEDICARE

## 2024-06-25 ENCOUNTER — HOSPITAL ENCOUNTER (OUTPATIENT)
Dept: RADIATION ONCOLOGY | Age: 69
Setting detail: RECURRING SERIES
Discharge: HOME | End: 2024-06-28
Attending: RADIOLOGY
Payer: MEDICARE

## 2024-06-25 DIAGNOSIS — C61 PROSTATE CANCER (HCC): Primary | ICD-10-CM

## 2024-06-25 NOTE — PROGRESS NOTES
Patient to receive virtual visit for 6 month Follow up Prostate  PSA lab complete    Called and talked with patient pre-visit  Has working phone   Is in [place]  Patient reports a pain level of  0/10  Patient reports no issues  Orders placed for PSAd in 6 months    
no.    GI ROS: regular yes.; pain with bowel movements no.; bleeding no.    Misc ROS: NA; all other review of systems are otherwise negative.      MEDICATIONS:     Current Outpatient Medications:     alfuzosin (UROXATRAL) 10 MG extended release tablet, Take 10 mg by mouth daily (Patient not taking: Reported on 6/25/2024), Disp: , Rfl:     amLODIPine (NORVASC) 10 MG tablet, Take 1 tablet by mouth daily, Disp: , Rfl:     atorvastatin (LIPITOR) 20 MG tablet, Take by mouth daily, Disp: , Rfl:     enalapril (VASOTEC) 20 MG tablet, Take 1 tablet by mouth 2 times daily, Disp: , Rfl:     methylPREDNISolone (MEDROL DOSEPACK) 4 MG tablet, Take 8 mg by mouth (Patient not taking: Reported on 6/25/2024), Disp: , Rfl:     ALLERGIES:   Allergies   Allergen Reactions    Sulfa Antibiotics Anaphylaxis       PHYSICAL EXAMINATION:   Phone visit.      LABORATORY: No results found for: \"NA\", \"K\", \"CL\", \"CO2\", \"GLU\", \"BUN\", \"GFRAA\", \"MG\", \"PHOS\", \"TP\", \"GLOB\", \"ALT\"  No results found for: \"WBC\", \"HGB\", \"HCT\", \"PLT\"    RADIOLOGY:  No new.    IMPRESSION:  Rohit Weston III is a 69 y.o. male w/ a hx of prostate cancer T1c, GS 3+4=7, PSA 5.1; unfavorable intermediate risk prostate cancer (due to % cores +)    PLAN:    1) Patient is recovering as anticipated from his prior course of radiotherapy.  2) Future follow up will be coordinated with other providers.    3) Phone visit 6 months with PSA 1 week prior - Please schedule labs on Tuesday or Friday. Please also arrange phone visit for a Tuesday as patient is off this day.     I spent a total of 11 minutes today performing the following care related activities for Rohit Weston III: /Educate Patient/Caregivers, Pre-Charting/Documenting after the visit, and Interpreting/Ordering Meds, Tests, Procedures    Dakota Mora MD  06/25/24

## 2024-12-06 ENCOUNTER — HOSPITAL ENCOUNTER (OUTPATIENT)
Dept: LAB | Age: 69
Discharge: HOME | End: 2024-12-06
Payer: MEDICARE

## 2024-12-06 DIAGNOSIS — C61 PROSTATE CANCER (HCC): ICD-10-CM

## 2024-12-06 LAB — PSA SERPL-MCNC: <0.1 NG/ML (ref 0–4)

## 2024-12-06 PROCEDURE — 36415 COLL VENOUS BLD VENIPUNCTURE: CPT

## 2024-12-06 PROCEDURE — 84153 ASSAY OF PSA TOTAL: CPT

## 2024-12-17 ENCOUNTER — HOSPITAL ENCOUNTER (OUTPATIENT)
Dept: RADIATION ONCOLOGY | Age: 69
Setting detail: RECURRING SERIES
Discharge: HOME | End: 2024-12-20
Attending: RADIOLOGY

## 2024-12-17 NOTE — PROGRESS NOTES
yes.; pain with bowel movements no.; bleeding no.    Misc ROS: NA; all other review of systems are otherwise negative.      MEDICATIONS:     Current Outpatient Medications:     alfuzosin (UROXATRAL) 10 MG extended release tablet, Take 10 mg by mouth daily (Patient not taking: Reported on 6/25/2024), Disp: , Rfl:     amLODIPine (NORVASC) 10 MG tablet, Take 1 tablet by mouth daily, Disp: , Rfl:     atorvastatin (LIPITOR) 20 MG tablet, Take by mouth daily, Disp: , Rfl:     enalapril (VASOTEC) 20 MG tablet, Take 1 tablet by mouth 2 times daily, Disp: , Rfl:     methylPREDNISolone (MEDROL DOSEPACK) 4 MG tablet, Take 8 mg by mouth (Patient not taking: Reported on 6/25/2024), Disp: , Rfl:     ALLERGIES:   Allergies   Allergen Reactions    Sulfa Antibiotics Anaphylaxis       PHYSICAL EXAMINATION:   Phone visit.      LABORATORY: No results found for: \"NA\", \"K\", \"CL\", \"CO2\", \"GLU\", \"BUN\", \"GFRAA\", \"MG\", \"PHOS\", \"TP\", \"GLOB\", \"ALT\"  No results found for: \"WBC\", \"HGB\", \"HCT\", \"PLT\"    RADIOLOGY:  No new.    IMPRESSION:  Rohit Weston III is a 69 y.o. male w/ a hx of prostate cancer T1c, GS 3+4=7, PSA 5.1; unfavorable intermediate risk prostate cancer (due to % cores +).     PLAN:    1) Patient is recovering as anticipated from his prior course of radiotherapy.  2) Future follow up will be coordinated with other providers.    3) Phone visit 6 months with PSA 1 week prior - Please schedule labs on Tuesday or Friday. Please also arrange phone visit for a Tuesday as patient is off this day.     I spent a total of 12 minutes today performing the following care related activities for Rohit Weston III: /Educate Patient/Caregivers, Pre-Charting/Documenting after the visit, and Interpreting/Ordering Meds, Tests, Procedures    Dakota Mora MD  12/17/24

## 2025-05-09 DIAGNOSIS — C61 PROSTATE CANCER (HCC): Primary | ICD-10-CM

## 2025-06-17 ENCOUNTER — HOSPITAL ENCOUNTER (OUTPATIENT)
Dept: LAB | Age: 70
Discharge: HOME | End: 2025-06-17
Payer: MEDICARE

## 2025-06-17 DIAGNOSIS — C61 PROSTATE CANCER (HCC): ICD-10-CM

## 2025-06-17 LAB — PSA SERPL-MCNC: <0.1 NG/ML (ref 0–4)

## 2025-06-17 PROCEDURE — 84153 ASSAY OF PSA TOTAL: CPT

## 2025-06-17 PROCEDURE — 36415 COLL VENOUS BLD VENIPUNCTURE: CPT

## 2025-06-24 ENCOUNTER — HOSPITAL ENCOUNTER (OUTPATIENT)
Dept: RADIATION ONCOLOGY | Age: 70
Setting detail: RECURRING SERIES
Discharge: HOME | End: 2025-06-27

## 2025-06-24 DIAGNOSIS — C61 PROSTATE CANCER (HCC): Primary | ICD-10-CM

## 2025-06-24 NOTE — PROGRESS NOTES
Documentation:  I communicated with the patient and/or health care decision maker about PSA results and future monitoring, resolution of XRT effects.   Details of this discussion including any medical advice provided: Above    Total Time: minutes: 11-20 minutes    Rohit Weston III was evaluated through a synchronous (real-time) audio-video encounter. Patient identification was verified at the start of the visit. He (or guardian if applicable) is aware that this is a billable service, which includes applicable co-pays. This visit was conducted with the patient's (and/or legal guardian's) verbal consent. He has not had a related appointment within my department in the past 7 days or scheduled within the next 24 hours.   The patient was located at Home: [address] [place] [phone].  The provider was located at Facility (Appt Dept): [address] [place] [address].    Note: not billable if this call serves to triage the patient into an appointment for the relevant concern    Rohit Weston III is a 70 y.o. male evaluated via video call on 6/24/2025 for No chief complaint on file.      Dakota Mora MD      Patient: Rohit Weston III MRN: [id number]  SSN: [id number]    YOB: 1955  Age: 70 y.o.  Sex: male      Other Providers:  Dr. Nuñez    DIAGNOSIS: prostate cancer T1c, GS 3+4=7, PSA 5.1; unfavorable intermediate risk prostate cancer (due to % cores +)    PREVIOUS TREATMENT:  Prostate/SV with ADT 7000cGy completing on 5/14/21    HISTORY OF PRESENT ILLNESS:    INTERVAL HISTORY:  Rohit Weston III is a 70 y.o. male w/ a history of unfavorable intermediate risk prostate cancer sp 70 Gy in 28 fractions completed on 05/14/21. He is doing reasonably well with minimal residual XRT effects. His PSA on 6/17/24 was <0.1.    Pertinent ROS:   ROS: dysuria no; hematuria no; leakage no.    GI ROS: regular yes.; pain with bowel movements no.;

## (undated) DEVICE — SUTURE MCRYL SZ 4-0 L27IN ABSRB UD L19MM PS-2 1/2 CIR PRIM Y426H

## (undated) DEVICE — BLADELESS OPTICAL TROCAR WITH FIXATION CANNULA: Brand: VERSAPORT

## (undated) DEVICE — MAX-CORE® DISPOSABLE CORE BIOPSY INSTRUMENT, 18G X 25CM: Brand: MAX-CORE

## (undated) DEVICE — INTENDED FOR TISSUE SEPARATION, AND OTHER PROCEDURES THAT REQUIRE A SHARP SURGICAL BLADE TO PUNCTURE OR CUT.: Brand: BARD-PARKER SAFETY BLADES SIZE 15, STERILE

## (undated) DEVICE — COVER EQUIP ABSRB TBL MOJAVE SHT L228XW101CM

## (undated) DEVICE — BLADELESS OBTURATOR: Brand: WECK VISTA

## (undated) DEVICE — CARDINAL HEALTH FLEXIBLE LIGHT HANDLE COVER: Brand: CARDINAL HEALTH

## (undated) DEVICE — KENDALL SCD EXPRESS SLEEVES, KNEE LENGTH, MEDIUM: Brand: KENDALL SCD

## (undated) DEVICE — NEEDLE HYPO 21GA L1.5IN INTRAMUSCULAR S STL LATCH BVL UP

## (undated) DEVICE — COLUMN DRAPE

## (undated) DEVICE — TIP COVER ACCESSORY

## (undated) DEVICE — GOWN,REINF,POLY,ECL,PP SLV,XL: Brand: MEDLINE

## (undated) DEVICE — LAP CHOLE: Brand: MEDLINE INDUSTRIES, INC.

## (undated) DEVICE — KIT BX PROST 20ML VI PREFIL W 10ML 10% NEUT BUFF FRMLN LEAK

## (undated) DEVICE — MEGA SUTURECUT ND: Brand: ENDOWRIST

## (undated) DEVICE — ARM DRAPE

## (undated) DEVICE — MONOPOLAR CAUTERY CORD

## (undated) DEVICE — TRAY CATH 16F DRN BG LTX -- CONVERT TO ITEM 363158

## (undated) DEVICE — SEAL UNIV 5-8MM DISP BX/10 -- DA VINCI XI - SNGL USE

## (undated) DEVICE — BIPOLAR CAUTERY CORD

## (undated) DEVICE — FENESTRATED BIPOLAR FORCEPS: Brand: ENDOWRIST

## (undated) DEVICE — NON-REM POLYHESIVE PATIENT RETURN ELECTRODE: Brand: VALLEYLAB

## (undated) DEVICE — PROGRASP FORCEPS: Brand: ENDOWRIST

## (undated) DEVICE — SUTURE V-LOC 180 SZ 3-0 L9IN ABSRB GRN L26MM V-20 1/2 CIR VLOCL0644

## (undated) DEVICE — SUT VCRL + 0 36IN UR6 VIO --

## (undated) DEVICE — STERILE PACKED. BORE DIAMETER 1.6 MM; ANGLE OF INSERTION 19° TO THE LONG AXIS OF THE TRANSDUCER: Brand: SINGLE-USE BIPLANE BIOPSY GUIDE

## (undated) DEVICE — ELECTRO LUBE IS A SINGLE PATIENT USE DEVICE THAT IS INTENDED TO BE USED ON ELECTROSURGICAL ELECTRODES TO REDUCE STICKING.: Brand: KEY SURGICAL ELECTRO LUBE

## (undated) DEVICE — SUTURE VCRL SZ 2-0 L27IN ABSRB VLT L26MM SH 1/2 CIR J317H

## (undated) DEVICE — APPLICATOR BNDG 1MM ADH PREMIERPRO EXOFIN

## (undated) DEVICE — VISUALIZATION SYSTEM: Brand: CLEARIFY

## (undated) DEVICE — [HIGH FLOW INSUFFLATOR,  DO NOT USE IF PACKAGE IS DAMAGED,  KEEP DRY,  KEEP AWAY FROM SUNLIGHT,  PROTECT FROM HEAT AND RADIOACTIVE SOURCES.]: Brand: PNEUMOSURE